# Patient Record
Sex: FEMALE | Race: BLACK OR AFRICAN AMERICAN | Employment: UNEMPLOYED | URBAN - METROPOLITAN AREA
[De-identification: names, ages, dates, MRNs, and addresses within clinical notes are randomized per-mention and may not be internally consistent; named-entity substitution may affect disease eponyms.]

---

## 2022-04-08 ENCOUNTER — HOSPITAL ENCOUNTER (EMERGENCY)
Age: 52
Discharge: HOME | End: 2022-04-08
Attending: EMERGENCY MEDICINE
Payer: COMMERCIAL

## 2022-04-08 VITALS
BODY MASS INDEX: 51.89 KG/M2 | SYSTOLIC BLOOD PRESSURE: 154 MMHG | HEIGHT: 62 IN | DIASTOLIC BLOOD PRESSURE: 78 MMHG | TEMPERATURE: 98.2 F | WEIGHT: 282 LBS | OXYGEN SATURATION: 97 % | RESPIRATION RATE: 16 BRPM | HEART RATE: 104 BPM

## 2022-04-08 DIAGNOSIS — R73.9 BLOOD GLUCOSE ELEVATED: Primary | ICD-10-CM

## 2022-04-08 LAB
ALBUMIN SERPL-MCNC: 2.9 G/DL (ref 3.4–5)
ALBUMIN/GLOB SERPL: 0.6 {RATIO} (ref 0.8–1.7)
ALP SERPL-CCNC: 132 U/L (ref 45–117)
ALT SERPL-CCNC: 16 U/L (ref 13–56)
ANION GAP SERPL CALC-SCNC: 7 MMOL/L (ref 3–18)
APPEARANCE UR: CLEAR
AST SERPL-CCNC: 12 U/L (ref 10–38)
ATRIAL RATE: 101 BPM
BASOPHILS # BLD: 0 K/UL (ref 0–0.1)
BASOPHILS NFR BLD: 1 % (ref 0–2)
BILIRUB SERPL-MCNC: 0.3 MG/DL (ref 0.2–1)
BILIRUB UR QL: NEGATIVE
BUN SERPL-MCNC: 10 MG/DL (ref 7–18)
BUN/CREAT SERPL: 16 (ref 12–20)
CALCIUM SERPL-MCNC: 9 MG/DL (ref 8.5–10.1)
CALCULATED P AXIS, ECG09: 9 DEGREES
CALCULATED R AXIS, ECG10: -9 DEGREES
CALCULATED T AXIS, ECG11: 48 DEGREES
CHLORIDE SERPL-SCNC: 102 MMOL/L (ref 100–111)
CO2 SERPL-SCNC: 26 MMOL/L (ref 21–32)
COLOR UR: YELLOW
CREAT SERPL-MCNC: 0.63 MG/DL (ref 0.6–1.3)
DIAGNOSIS, 93000: NORMAL
DIFFERENTIAL METHOD BLD: NORMAL
EOSINOPHIL # BLD: 0.1 K/UL (ref 0–0.4)
EOSINOPHIL NFR BLD: 1 % (ref 0–5)
ERYTHROCYTE [DISTWIDTH] IN BLOOD BY AUTOMATED COUNT: 12.4 % (ref 11.6–14.5)
GLOBULIN SER CALC-MCNC: 4.6 G/DL (ref 2–4)
GLUCOSE BLD STRIP.AUTO-MCNC: 264 MG/DL (ref 70–110)
GLUCOSE BLD STRIP.AUTO-MCNC: 336 MG/DL (ref 70–110)
GLUCOSE SERPL-MCNC: 389 MG/DL (ref 74–99)
GLUCOSE UR STRIP.AUTO-MCNC: >1000 MG/DL
HCT VFR BLD AUTO: 38.5 % (ref 35–45)
HGB BLD-MCNC: 12.9 G/DL (ref 12–16)
HGB UR QL STRIP: NEGATIVE
IMM GRANULOCYTES # BLD AUTO: 0 K/UL (ref 0–0.04)
IMM GRANULOCYTES NFR BLD AUTO: 0 % (ref 0–0.5)
KETONES UR QL STRIP.AUTO: 80 MG/DL
LEUKOCYTE ESTERASE UR QL STRIP.AUTO: NEGATIVE
LYMPHOCYTES # BLD: 2.8 K/UL (ref 0.9–3.6)
LYMPHOCYTES NFR BLD: 42 % (ref 21–52)
MCH RBC QN AUTO: 28 PG (ref 24–34)
MCHC RBC AUTO-ENTMCNC: 33.5 G/DL (ref 31–37)
MCV RBC AUTO: 83.7 FL (ref 78–100)
MONOCYTES # BLD: 0.5 K/UL (ref 0.05–1.2)
MONOCYTES NFR BLD: 7 % (ref 3–10)
NEUTS SEG # BLD: 3.3 K/UL (ref 1.8–8)
NEUTS SEG NFR BLD: 49 % (ref 40–73)
NITRITE UR QL STRIP.AUTO: NEGATIVE
NRBC # BLD: 0 K/UL (ref 0–0.01)
NRBC BLD-RTO: 0 PER 100 WBC
P-R INTERVAL, ECG05: 148 MS
PH UR STRIP: 6 [PH] (ref 5–8)
PLATELET # BLD AUTO: 357 K/UL (ref 135–420)
PMV BLD AUTO: 10.5 FL (ref 9.2–11.8)
POTASSIUM SERPL-SCNC: 4.2 MMOL/L (ref 3.5–5.5)
PROT SERPL-MCNC: 7.5 G/DL (ref 6.4–8.2)
PROT UR STRIP-MCNC: NEGATIVE MG/DL
Q-T INTERVAL, ECG07: 352 MS
QRS DURATION, ECG06: 94 MS
QTC CALCULATION (BEZET), ECG08: 456 MS
RBC # BLD AUTO: 4.6 M/UL (ref 4.2–5.3)
SODIUM SERPL-SCNC: 135 MMOL/L (ref 136–145)
SP GR UR REFRACTOMETRY: >1.03 (ref 1–1.03)
UROBILINOGEN UR QL STRIP.AUTO: 1 EU/DL (ref 0.2–1)
VENTRICULAR RATE, ECG03: 101 BPM
WBC # BLD AUTO: 6.7 K/UL (ref 4.6–13.2)

## 2022-04-08 PROCEDURE — 93005 ELECTROCARDIOGRAM TRACING: CPT

## 2022-04-08 PROCEDURE — 81003 URINALYSIS AUTO W/O SCOPE: CPT

## 2022-04-08 PROCEDURE — 96361 HYDRATE IV INFUSION ADD-ON: CPT

## 2022-04-08 PROCEDURE — 99284 EMERGENCY DEPT VISIT MOD MDM: CPT

## 2022-04-08 PROCEDURE — 82962 GLUCOSE BLOOD TEST: CPT

## 2022-04-08 PROCEDURE — 74011250636 HC RX REV CODE- 250/636: Performed by: EMERGENCY MEDICINE

## 2022-04-08 PROCEDURE — 74011636637 HC RX REV CODE- 636/637: Performed by: EMERGENCY MEDICINE

## 2022-04-08 PROCEDURE — 96360 HYDRATION IV INFUSION INIT: CPT

## 2022-04-08 PROCEDURE — 85025 COMPLETE CBC W/AUTO DIFF WBC: CPT

## 2022-04-08 PROCEDURE — 80053 COMPREHEN METABOLIC PANEL: CPT

## 2022-04-08 RX ORDER — ATORVASTATIN CALCIUM 20 MG/1
20 TABLET, FILM COATED ORAL DAILY
Qty: 30 TABLET | Refills: 0 | Status: SHIPPED | OUTPATIENT
Start: 2022-04-08 | End: 2022-05-03 | Stop reason: SDUPTHER

## 2022-04-08 RX ORDER — INSULIN GLARGINE 100 [IU]/ML
14 INJECTION, SOLUTION SUBCUTANEOUS
Qty: 4.2 ML | Refills: 0 | Status: SHIPPED | OUTPATIENT
Start: 2022-04-08 | End: 2022-05-03

## 2022-04-08 RX ORDER — METFORMIN HYDROCHLORIDE 500 MG/1
1000 TABLET ORAL 2 TIMES DAILY WITH MEALS
Qty: 30 TABLET | Refills: 0 | Status: SHIPPED | OUTPATIENT
Start: 2022-04-08 | End: 2022-05-03

## 2022-04-08 RX ORDER — ATENOLOL 50 MG/1
50 TABLET ORAL DAILY
Qty: 30 TABLET | Refills: 0 | Status: SHIPPED | OUTPATIENT
Start: 2022-04-08 | End: 2022-05-03 | Stop reason: SDUPTHER

## 2022-04-08 RX ADMIN — SODIUM CHLORIDE 1000 ML: 9 INJECTION, SOLUTION INTRAVENOUS at 08:49

## 2022-04-08 RX ADMIN — Medication 10 UNITS: at 10:16

## 2022-04-08 RX ADMIN — SODIUM CHLORIDE 1000 ML: 900 INJECTION, SOLUTION INTRAVENOUS at 10:16

## 2022-04-08 NOTE — ED NOTES
Labs drawn off IV site to right [facility] #20g labeled at bedside and walked to lab. Pt tolerated well.

## 2022-04-08 NOTE — ED TRIAGE NOTES
Pt arrived to ED c/o elevated BS. [facility] and told her [facility] was 400. Pt with hx DM and has been out of insulin for two months. Pt c/o bilat leg numbness and dry mouth. Pt denies CP, SOB, urinary symptoms. Pt is alwrt and awake. Pt amb to room with steady gait.

## 2022-04-08 NOTE — ED PROVIDER NOTES
EMERGENCY DEPARTMENT HISTORY AND PHYSICAL EXAM    8:15 AM  Date: (Not on file)  Patient Name: Richard Kingston    History of Presenting Illness       History Provided By:     HPI: Richard Kingston is a 46 y.o. female with past medical history of diabetes presents with elevated blood glucose. Patient describes having dry mouth and tingling of her bilateral extremities. As per patient she moved here from [place] recently and she ran out of her medicine 2 months ago. Patient was on atenolol, atorvastatin, glargine Metformin. Patient was seen yesterday in urgent care and her glucose was 410. Patient does not have a PMD yet. No nausea, vomiting or abdominal pain. PCP: No primary care provider on file. Past History     Past Medical History:  No past medical history on file. Past Surgical History:  No past surgical history on file. Family History:  No family history on file. Social History:  Social History     Tobacco Use    Smoking status: Not on file    Smokeless tobacco: Not on file   Substance Use Topics    Alcohol use: Not on file    Drug use: Not on file       Allergies:  Not on File    Review of Systems   Review of Systems   Constitutional: Negative for activity change, appetite change and chills. HENT: Negative for congestion, ear discharge, ear pain and sore throat. Eyes: Negative for photophobia and pain. Respiratory: Negative for cough and choking. Cardiovascular: Negative for palpitations and leg swelling. Gastrointestinal: Negative for anal bleeding and rectal pain. Endocrine: Negative for polydipsia and polyuria. Genitourinary: Negative for genital sores and urgency. Musculoskeletal: Negative for arthralgias and myalgias. Neurological: Negative for dizziness, seizures and speech difficulty. Psychiatric/Behavioral: Negative for hallucinations, self-injury and suicidal ideas. Physical Exam     No data found.     Physical Exam  Vitals and nursing note reviewed. Constitutional:       Appearance: She is well-developed. HENT:      Head: Normocephalic and atraumatic. Eyes:      General:         Right eye: No discharge. Left eye: No discharge. Cardiovascular:      Rate and Rhythm: Normal rate and regular rhythm. Heart sounds: Normal heart sounds. No murmur heard. Pulmonary:      Effort: Pulmonary effort is normal. No respiratory distress. Breath sounds: Normal breath sounds. No stridor. No wheezing or rales. Chest:      Chest wall: No tenderness. Abdominal:      General: Bowel sounds are normal. There is no distension. Palpations: Abdomen is soft. Tenderness: There is no abdominal tenderness. There is no guarding or rebound. Musculoskeletal:         General: Normal range of motion. Cervical back: Normal range of motion and neck supple. Skin:     General: Skin is warm and dry. Neurological:      Mental Status: She is alert and oriented to person, place, and time. Diagnostic Study Results     Labs -  No results found for this or any previous visit (from the past 12 hour(s)). Radiologic Studies -   No results found. Medical Decision Making     ED Course: Progress Notes, Reevaluation, and Consults:    8:15 AM Initial assessment performed. The patients presenting problems have been discussed, and they/their family are in agreement with the care plan formulated and outlined with them. I have encouraged them to ask questions as they arise throughout their visit. Provider Notes (Medical Decision Making):   Patient with past medical history of diabetes noncompliant with medication presents with elevated blood glucose   vitals within normal limits apart from hypertension.   Plan to obtain labs, imaging  Old medical records reviewed:  EKG as interpreted by me: 101, sinus tachycardia, no ST changes  Labs as interpreted by me:  Glucose 389  No leukocytosis, no electrolyte abnormality  No elevated anion gap  Repeat glucose 264  Patient given IV fluids and insulin  Repeat glucose 264  Patient currently asymptomatic  Patient will be given prescription of her Medicare medications and advised follow-up with PMD for blood pressure and diabetes control  Strict return precautions given      Vital Signs-Reviewed the patient's vital signs. Reviewed pt's pulse ox reading. Records Reviewed: old medical records  -I am the first provider for this patient.  -I reviewed the vital signs, available nursing notes, past medical history, past surgical history, family history and social history. Clinical Impression     Clinical Impression: No diagnosis found. Disposition:    Pt has been reexamined. Patient has no new complaints, changes, or physical findings. Care plan outlined and precautions discussed. Results were reviewed with the patient. All medications were reviewed with the patient; will d/c home with PMD f/u. All of pt's questions and concerns were addressed. Patient was instructed and agrees to follow up with PMD, as well as to return to the ED upon further deterioration. Patient is ready to go home. This note was dictated utilizing voice recognition software which may lead to typographical errors. I apologize in advance if the situation occurs. If questions arise please do not hesitate to contact me or call our department. This note was dictated utilizing voice recognition software which may lead to typographical errors. I apologize in advance if the situation occurs. If questions arise please do not hesitate to contact me or call our department.     Ayesha France MD  8:15 AM

## 2022-05-03 ENCOUNTER — OFFICE VISIT (OUTPATIENT)
Dept: FAMILY MEDICINE CLINIC | Age: 52
End: 2022-05-03
Payer: COMMERCIAL

## 2022-05-03 VITALS
SYSTOLIC BLOOD PRESSURE: 130 MMHG | HEART RATE: 84 BPM | OXYGEN SATURATION: 99 % | WEIGHT: 282 LBS | TEMPERATURE: 98 F | HEIGHT: 66 IN | BODY MASS INDEX: 45.32 KG/M2 | DIASTOLIC BLOOD PRESSURE: 80 MMHG | RESPIRATION RATE: 16 BRPM

## 2022-05-03 DIAGNOSIS — E11.65 TYPE 2 DIABETES MELLITUS WITH HYPERGLYCEMIA, UNSPECIFIED WHETHER LONG TERM INSULIN USE (HCC): Primary | ICD-10-CM

## 2022-05-03 DIAGNOSIS — I10 ESSENTIAL HYPERTENSION: ICD-10-CM

## 2022-05-03 DIAGNOSIS — Z11.59 ENCOUNTER FOR HEPATITIS C SCREENING TEST FOR LOW RISK PATIENT: ICD-10-CM

## 2022-05-03 DIAGNOSIS — Z00.00 ENCOUNTER FOR MEDICAL EXAMINATION TO ESTABLISH CARE: ICD-10-CM

## 2022-05-03 DIAGNOSIS — E78.5 HYPERLIPIDEMIA, UNSPECIFIED HYPERLIPIDEMIA TYPE: ICD-10-CM

## 2022-05-03 DIAGNOSIS — Z12.31 BREAST CANCER SCREENING BY MAMMOGRAM: ICD-10-CM

## 2022-05-03 LAB — HBA1C MFR BLD HPLC: 13.1 %

## 2022-05-03 PROCEDURE — 83036 HEMOGLOBIN GLYCOSYLATED A1C: CPT | Performed by: STUDENT IN AN ORGANIZED HEALTH CARE EDUCATION/TRAINING PROGRAM

## 2022-05-03 PROCEDURE — 3046F HEMOGLOBIN A1C LEVEL >9.0%: CPT | Performed by: STUDENT IN AN ORGANIZED HEALTH CARE EDUCATION/TRAINING PROGRAM

## 2022-05-03 PROCEDURE — 99204 OFFICE O/P NEW MOD 45 MIN: CPT | Performed by: STUDENT IN AN ORGANIZED HEALTH CARE EDUCATION/TRAINING PROGRAM

## 2022-05-03 RX ORDER — METFORMIN HYDROCHLORIDE 500 MG/1
1000 TABLET, EXTENDED RELEASE ORAL 2 TIMES DAILY
COMMUNITY
Start: 2022-04-20

## 2022-05-03 RX ORDER — ATENOLOL 50 MG/1
50 TABLET ORAL DAILY
Qty: 30 TABLET | Refills: 0 | Status: SHIPPED | OUTPATIENT
Start: 2022-05-03 | End: 2022-05-27

## 2022-05-03 RX ORDER — DULAGLUTIDE 0.75 MG/.5ML
0.75 INJECTION, SOLUTION SUBCUTANEOUS
Qty: 2 ML | Refills: 0 | Status: SHIPPED | OUTPATIENT
Start: 2022-05-03 | End: 2022-05-31

## 2022-05-03 RX ORDER — ATORVASTATIN CALCIUM 20 MG/1
20 TABLET, FILM COATED ORAL DAILY
Qty: 30 TABLET | Refills: 0 | Status: SHIPPED | OUTPATIENT
Start: 2022-05-03 | End: 2022-05-27

## 2022-05-03 RX ORDER — INSULIN GLARGINE 100 [IU]/ML
10 INJECTION, SOLUTION SUBCUTANEOUS
Qty: 3 ML | Refills: 0
Start: 2022-05-03 | End: 2022-06-02

## 2022-05-03 NOTE — PROGRESS NOTES
Solitario Nelson is a 46 y.o. female presenting today for Establish Care  . Chief Complaint   Patient presents with    Establish Care       HPI:  Solitario Nelson presents to the office today to establish care. Patient has a PMHx of T2DM, HTN, HLD. Patient presented to ED on 4/8/22 for medication refills and hyperglycemia. She had recently visited urgent care due to the same. She was noted to have elevated glucose: 389 with glucosuria. Patient did not have insurance so was unable to follow with a PCP. She was unable to afford medications since November 2021. Patient now has insurance and desires to get back on track. T2DM: She was diagnosed 10 years ago. Patient was started on Lantus 10 units nightly along with metformin 500 mg twice daily. She has been on glipizide and glimepiride in the past. Patient reports polydipsia. Polyuria has improved. She reported GI symptoms - diarrhea with metformin that improved once she switched to the ER formulation. Reports numbness/tingling in her bilateral feet. Last [facility]1c was 15% at [facility]. Today it is 13.1%  Patient has a glucometer at home but has not been checking her sugars as she states it makes her feel depressed looking at the high numbers. HTN: Patient is on atenolol with improved BP as compared to ED visit    HLD: She was started back on atorvastatin. Review of Systems   Constitutional: Negative for chills, diaphoresis, fever, malaise/fatigue and weight loss. HENT: Negative for congestion, ear discharge, ear pain, hearing loss, nosebleeds, sinus pain, sore throat and tinnitus. Eyes: Negative for blurred vision, double vision and photophobia. Respiratory: Negative for cough, sputum production, shortness of breath, wheezing and stridor. Cardiovascular: Positive for palpitations. Negative for chest pain, orthopnea, claudication and leg swelling. Gastrointestinal: Positive for heartburn.  Negative for abdominal pain, blood in stool, constipation, diarrhea, melena, nausea and vomiting. Genitourinary: Positive for frequency. Negative for dysuria, flank pain, hematuria and urgency. Musculoskeletal: Positive for back pain and joint pain. Negative for myalgias and neck pain. Skin: Negative for rash. Neurological: Positive for tingling and sensory change. Negative for tremors, speech change, focal weakness, seizures, weakness and headaches. Psychiatric/Behavioral: Negative for depression. The patient is not nervous/anxious. All other systems reviewed and are negative. Allergies   Allergen Reactions    Codeine Itching       PHQ Screening   3 most recent PHQ Screens 5/3/2022   Little interest or pleasure in doing things Not at all   Feeling down, depressed, irritable, or hopeless Nearly every day   Total Score PHQ 2 3   Trouble falling or staying asleep, or sleeping too much Nearly every day   Feeling tired or having little energy Not at all   Poor appetite, weight loss, or overeating Not at all   Feeling bad about yourself - or that you are a failure or have let yourself or your family down Not at all   Trouble concentrating on things such as school, work, reading, or watching TV Not at all   Moving or speaking so slowly that other people could have noticed; or the opposite being so fidgety that others notice Nearly every day   Thoughts of being better off dead, or hurting yourself in some way Not at all   PHQ 9 Score 9   How difficult have these problems made it for you to do your work, take care of your home and get along with others Somewhat difficult       History  Past Medical History:   Diagnosis Date    Arthritis     Diabetes ([facility] [address]        History reviewed. No pertinent surgical history.     Social History     Socioeconomic History    Marital status: SINGLE     Spouse name: Not on file    Number of children: Not on file    Years of education: Not on file    Highest education level: Not on file   Occupational History    Not on file   Tobacco Use    Smoking status: Never Smoker    Smokeless tobacco: Never Used   Vaping Use    Vaping Use: Never used   Substance and Sexual Activity    Alcohol use: Yes    Drug use: Never    Sexual activity: Never   Other Topics Concern    Not on file   Social History Narrative    Not on file     Social Determinants of Health     Financial Resource Strain:     Difficulty of Paying Living Expenses: Not on file   Food Insecurity:     Worried About Running Out of Food in the Last Year: Not on file    Koby of Food in the Last Year: Not on file   Transportation Needs:     Lack of Transportation (Medical): Not on file    Lack of Transportation (Non-Medical): Not on file   Physical Activity:     Days of Exercise per Week: Not on file    Minutes of Exercise per Session: Not on file   Stress:     Feeling of Stress : Not on file   Social Connections:     Frequency of Communication with Friends and Family: Not on file    Frequency of Social Gatherings with Friends and Family: Not on file    Attends Mu-ism Services: Not on file    Active Member of [address] [place] [address] or Organizations: Not on file    Attends Club or Organization Meetings: Not on file    Marital Status: Not on file   Intimate Partner Violence:     Fear of Current or Ex-Partner: Not on file    Emotionally Abused: Not on file    Physically Abused: Not on file    Sexually Abused: Not on file   Housing Stability:     Unable to Pay for Housing in the Last Year: Not on file    Number of [place] in the Last Year: Not on file    Unstable Housing in the Last Year: Not on file       Current Outpatient Medications   Medication Sig Dispense Refill    atenoloL (TENORMIN) 50 mg tablet Take 1 Tablet by mouth daily for 30 days. 30 Tablet 0    atorvastatin (LIPITOR) 20 mg tablet Take 1 Tablet by mouth daily.  30 Tablet 0    dulaglutide (Trulicity) 2.34 JG/1.4 mL sub-q pen 0.5 mL by SubCUTAneous route every seven (7) days for 30 days. Indications: type 2 diabetes mellitus 2 mL 0    insulin glargine (Basaglar KwikPen U-100 Insulin) 100 unit/mL (3 mL) inpn 10 Units by SubCUTAneous route nightly for 30 days. 3 mL 0    metFORMIN ER (GLUCOPHAGE XR) 500 mg tablet Take 1,000 mg by mouth two (2) times a day. Vitals:    05/03/22 0934   BP: 130/80   Pulse: 84   Resp: 16   Temp: 98 °F (36.7 °C)   TempSrc: Oral   SpO2: 99%   Weight: 282 lb (127.9 kg)   Height: 5' 6\" (1.676 m)   PainSc:   4       Physical Exam  Vitals and nursing note reviewed. Constitutional:       General: She is not in acute distress. Appearance: Normal appearance. She is obese. She is not ill-appearing, toxic-appearing or diaphoretic. HENT:      Head: Normocephalic and atraumatic. Eyes:      General: No scleral icterus. Extraocular Movements: Extraocular movements intact. Conjunctiva/sclera: Conjunctivae normal.      Pupils: Pupils are equal, round, and reactive to light. Cardiovascular:      Rate and Rhythm: Normal rate and regular rhythm. Pulses: Normal pulses. Heart sounds: No murmur heard. Pulmonary:      Effort: Pulmonary effort is normal. No respiratory distress. Breath sounds: Normal breath sounds. No wheezing or rales. Abdominal:      General: Bowel sounds are normal. There is no distension. Palpations: Abdomen is soft. Tenderness: There is no abdominal tenderness. There is no guarding. Musculoskeletal:      Cervical back: Normal range of motion. Right lower leg: Edema present. Left lower leg: Edema present. Comments: Limited ROM due to back and knee pain  Trace LE edema   Skin:     General: Skin is warm and dry. Coloration: Skin is not jaundiced or pale. Neurological:      General: No focal deficit present. Mental Status: She is alert and oriented to person, place, and time. Mental status is at baseline. Cranial Nerves: No cranial nerve deficit. Motor: No weakness. Gait: Gait normal.   Psychiatric:         Mood and Affect: Mood normal.         Behavior: Behavior normal.         Thought Content: Thought content normal.         Judgment: Judgment normal.       Diabetic foot exam: (5/3/22)    Left Foot:   Visual Exam: normal    Pulse DP: 2+ (normal)   Filament test: reduced sensation          Right Foot:   Visual Exam: normal    Pulse DP: 2+ (normal)   Filament test: reduced sensation            Office Visit on 05/03/2022   Component Date Value Ref Range Status    Hemoglobin A1c (POC) 05/03/2022 13.1  % Final   Admission on 04/08/2022, Discharged on 04/08/2022   Component Date Value Ref Range Status    WBC 04/08/2022 6.7  4.6 - 13.2 K/uL Final    RBC 04/08/2022 4.60  4.20 - 5.30 M/uL Final    HGB 04/08/2022 12.9  12.0 - 16.0 g/dL Final    HCT 04/08/2022 38.5  35.0 - 45.0 % Final    MCV 04/08/2022 83.7  78.0 - 100.0 FL Final    MCH 04/08/2022 28.0  24.0 - 34.0 PG Final    MCHC 04/08/2022 33.5  31.0 - 37.0 g/dL Final    RDW 04/08/2022 12.4  11.6 - 14.5 % Final    PLATELET 70/83/5348 352  135 - 420 K/uL Final    MPV 04/08/2022 10.5  9.2 - 11.8 FL Final    NRBC 04/08/2022 0.0  0  WBC Final    ABSOLUTE NRBC 04/08/2022 0.00  0.00 - 0.01 K/uL Final    NEUTROPHILS 04/08/2022 49  40 - 73 % Final    LYMPHOCYTES 04/08/2022 42  21 - 52 % Final    MONOCYTES 04/08/2022 7  3 - 10 % Final    EOSINOPHILS 04/08/2022 1  0 - 5 % Final    BASOPHILS 04/08/2022 1  0 - 2 % Final    IMMATURE GRANULOCYTES 04/08/2022 0  0.0 - 0.5 % Final    ABS. NEUTROPHILS 04/08/2022 3.3  1.8 - 8.0 K/UL Final    ABS. LYMPHOCYTES 04/08/2022 2.8  0.9 - 3.6 K/UL Final    ABS. MONOCYTES 04/08/2022 0.5  0.05 - 1.2 K/UL Final    ABS. EOSINOPHILS 04/08/2022 0.1  0.0 - 0.4 K/UL Final    ABS. BASOPHILS 04/08/2022 0.0  0.0 - 0.1 K/UL Final    ABS. IMM.  GRANS. 04/08/2022 0.0  0.00 - 0.04 K/UL Final    DF 04/08/2022 AUTOMATED    Final    Sodium 04/08/2022 135* 136 - 145 mmol/L Final    Potassium 04/08/2022 4.2  3.5 - 5.5 mmol/L Final    Chloride 04/08/2022 102  100 - 111 mmol/L Final    CO2 04/08/2022 26  21 - 32 mmol/L Final    Anion gap 04/08/2022 7  3.0 - 18 mmol/L Final    Glucose 04/08/2022 389* 74 - 99 mg/dL Final    BUN 04/08/2022 10  7.0 - 18 MG/DL Final    Creatinine 04/08/2022 0.63  0.6 - 1.3 MG/DL Final    BUN/Creatinine ratio 04/08/2022 16  12 - 20   Final    GFR est AA 04/08/2022 >60  >60 ml/min/1.73m2 Final    GFR est non-AA 04/08/2022 >60  >60 ml/min/1.73m2 Final    Comment: (NOTE)  Estimated GFR is calculated using the Modification of Diet in Renal   Disease (MDRD) Study equation, reported for both  Americans   (GFRAA) and non- Americans (GFRNA), and normalized to 1.73m2   body surface area. The physician must decide which value applies to   the patient. The MDRD study equation should only be used in   individuals age 25 or older. It has not been validated for the   following: pregnant women, patients with serious comorbid conditions,   or on certain medications, or persons with extremes of body size,   muscle mass, or nutritional status.  Calcium 04/08/2022 9.0  8.5 - 10.1 MG/DL Final    Bilirubin, total 04/08/2022 0.3  0.2 - 1.0 MG/DL Final    ALT (SGPT) 04/08/2022 16  13 - 56 U/L Final    AST (SGOT) 04/08/2022 12  10 - 38 U/L Final    Alk.  phosphatase 04/08/2022 132* 45 - 117 U/L Final    Protein, total 04/08/2022 7.5  6.4 - 8.2 g/dL Final    Albumin 04/08/2022 2.9* 3.4 - 5.0 g/dL Final    Globulin 04/08/2022 4.6* 2.0 - 4.0 g/dL Final    A-G Ratio 04/08/2022 0.6* 0.8 - 1.7   Final    Ventricular Rate 04/08/2022 101  BPM Final    Atrial Rate 04/08/2022 101  BPM Final    P-R Interval 04/08/2022 148  ms Final    QRS Duration 04/08/2022 94  ms Final    Q-T Interval 04/08/2022 352  ms Final    QTC Calculation (Bezet) 04/08/2022 456  ms Final    Calculated P Axis 04/08/2022 9  degrees Final    Calculated R Axis 04/08/2022 -9  degrees Final    Calculated T Axis 04/08/2022 48  degrees Final    Diagnosis 04/08/2022    Final                    Value:Sinus tachycardia  Possible Left atrial enlargement  Septal infarct , age undetermined  Abnormal ECG  No previous ECGs available  Confirmed by Silvana Payan MD, Mary Torres ([id number]) on 4/8/2022 4:55:37 PM      Glucose (POC) 04/08/2022 336* 70 - 110 mg/dL Final    Comment: Notified RN or MD immediately by   (NOTE)  The FDA has indicated that no capillary point of care blood glucose   monitoring systems are approved for use in \"critically ill\" patients,   however they have not defined this population. The College of   American Pathologists has recommended that these devices should not   be used in cases such as severe hypotension, dehydration, shock, and   hyperglycemic-hyperosmolar state, amongst others. Venous or arterial   collection is the recommended specimen for testing these patients.  Color 04/08/2022 YELLOW    Final    Appearance 04/08/2022 CLEAR    Final    Specific gravity 04/08/2022 >1.030* 1.005 - 1.030 Final    pH (UA) 04/08/2022 6.0  5.0 - 8.0   Final    Protein 04/08/2022 Negative  NEG mg/dL Final    Glucose 04/08/2022 >1,000* NEG mg/dL Final    Ketone 04/08/2022 80* NEG mg/dL Final    Bilirubin 04/08/2022 Negative  NEG   Final    Blood 04/08/2022 Negative  NEG   Final    Urobilinogen 04/08/2022 1.0  0.2 - 1.0 EU/dL Final    Nitrites 04/08/2022 Negative  NEG   Final    Leukocyte Esterase 04/08/2022 Negative  NEG   Final    Glucose (POC) 04/08/2022 264* 70 - 110 mg/dL Final    Comment: (NOTE)  The FDA has indicated that no capillary point of care blood glucose   monitoring systems are approved for use in \"critically ill\" patients,   however they have not defined this population. The College of   American Pathologists has recommended that these devices should not   be used in cases such as severe hypotension, dehydration, shock, and   hyperglycemic-hyperosmolar state, amongst others. Venous or arterial   collection is the recommended specimen for testing these patients. Results for orders placed or performed in visit on 05/03/22   AMB POC HEMOGLOBIN A1C   Result Value Ref Range    Hemoglobin A1c (POC) 13.1 %       Patient Care Team:  Patient Care Team:  None as PCP - General      Assessment / Plan:      ICD-10-CM ICD-9-CM    1. Type 2 diabetes mellitus with hyperglycemia, unspecified whether long term insulin use (HCC)  E11.65 250.00 AMB POC HEMOGLOBIN A1C      MICROALBUMIN, UR, RAND W/ MICROALB/CREAT RATIO      dulaglutide (Trulicity) 5.41 UP/6.5 mL sub-q pen       DIABETES FOOT EXAM      insulin glargine (Basaglar KwikPen U-100 Insulin) 100 unit/mL (3 mL) inpn   2. Breast cancer screening by mammogram  Z12.31 V76.12 [facility] MAMMO BI SCREENING INCL CAD   3. Essential hypertension  I10 401.9 atenoloL (TENORMIN) 50 mg tablet      TSH 3RD GENERATION   4. Hyperlipidemia, unspecified hyperlipidemia type  E78.5 272.4 atorvastatin (LIPITOR) 20 mg tablet      LIPID PANEL   5. Encounter for hepatitis C screening test for low risk patient  Z11.59 V73.89 HEPATITIS C AB   6. Encounter for medical examination to establish care  Z00.00 V70.9      T2DM: Uncontrolled. Continue Metformin. Continue insulin lantus at 10 units nightly for now. Counseled on keeping a FBS log and bringing it with her next visit. Will start on Trulicity if covered by her insurance. Foot exam with reduced sensation bilaterally. Check urine microalbumin - may consider starting on low dose ARB next visit. Due for eye exam.     HLD: Continue statin: Check lipid panel. HTN: BP acceptable. Continue atenolol. Patient has multiple healthcare gaps. Mammogram ordered. Discuss Colon Ca screening at next visit  Due for pap smear. Follow-up and Dispositions    · Return in about 4 weeks (around 5/31/2022) for Diabetes F/U, 15 mins, Labs Review.          I asked the patient if she  had any questions and answered her questions. The patient stated that she understands the treatment plan and agrees with the treatment plan    This document was created with a voice activated dictation system and may contain transcription errors.

## 2022-05-03 NOTE — PROGRESS NOTES
Chief Complaint   Patient presents with   [facility]     1. \"Have you been to the ER, urgent care clinic since your last visit? Hospitalized since your last visit? \" Yes    2. \"Have you seen or consulted any other health care providers outside of the [address] [place] [address] since your last visit? \" Yes      3. For patients aged 39-70: Has the patient had a colonoscopy / FIT/ Cologuard? No      If the patient is female:    4. For patients aged 41-77: Has the patient had a mammogram within the past 2 years? No      5. For patients aged 21-65: Has the patient had a pap smear?  No

## 2022-05-09 ENCOUNTER — TRANSCRIBE ORDER (OUTPATIENT)
Dept: SCHEDULING | Age: 52
End: 2022-05-09

## 2022-05-09 DIAGNOSIS — Z12.31 VISIT FOR SCREENING MAMMOGRAM: Primary | ICD-10-CM

## 2022-05-17 ENCOUNTER — HOSPITAL ENCOUNTER (OUTPATIENT)
Dept: MAMMOGRAPHY | Age: 52
Discharge: HOME | End: 2022-05-17
Attending: STUDENT IN AN ORGANIZED HEALTH CARE EDUCATION/TRAINING PROGRAM
Payer: COMMERCIAL

## 2022-05-17 DIAGNOSIS — Z12.31 VISIT FOR SCREENING MAMMOGRAM: ICD-10-CM

## 2022-05-17 PROCEDURE — 77063 BREAST TOMOSYNTHESIS BI: CPT

## 2022-05-18 ENCOUNTER — TELEPHONE (OUTPATIENT)
Dept: FAMILY MEDICINE CLINIC | Age: 52
End: 2022-05-18

## 2022-05-18 DIAGNOSIS — R92.8 ABNORMAL MAMMOGRAM OF LEFT BREAST: Primary | ICD-10-CM

## 2022-05-18 NOTE — TELEPHONE ENCOUNTER
Mammogram results discussed with patient over the phone. Screening mammogram showed left breast nodule - will order a diagnostic mammogram and ultrasound.

## 2022-05-27 DIAGNOSIS — E78.5 HYPERLIPIDEMIA, UNSPECIFIED HYPERLIPIDEMIA TYPE: ICD-10-CM

## 2022-05-27 DIAGNOSIS — I10 ESSENTIAL HYPERTENSION: ICD-10-CM

## 2022-05-27 RX ORDER — ATORVASTATIN CALCIUM 20 MG/1
TABLET, FILM COATED ORAL
Qty: 30 TABLET | Refills: 0 | Status: SHIPPED | OUTPATIENT
Start: 2022-05-27 | End: 2022-06-21

## 2022-05-27 RX ORDER — ATENOLOL 50 MG/1
TABLET ORAL
Qty: 30 TABLET | Refills: 0 | Status: SHIPPED | OUTPATIENT
Start: 2022-05-27 | End: 2022-06-21

## 2022-05-31 ENCOUNTER — OFFICE VISIT (OUTPATIENT)
Dept: FAMILY MEDICINE CLINIC | Age: 52
End: 2022-05-31
Payer: COMMERCIAL

## 2022-05-31 ENCOUNTER — HOSPITAL ENCOUNTER (OUTPATIENT)
Dept: LAB | Age: 52
Discharge: HOME | End: 2022-05-31
Payer: COMMERCIAL

## 2022-05-31 VITALS
TEMPERATURE: 97.6 F | BODY MASS INDEX: 45.64 KG/M2 | HEIGHT: 66 IN | DIASTOLIC BLOOD PRESSURE: 70 MMHG | SYSTOLIC BLOOD PRESSURE: 125 MMHG | HEART RATE: 88 BPM | RESPIRATION RATE: 16 BRPM | WEIGHT: 284 LBS | OXYGEN SATURATION: 95 %

## 2022-05-31 DIAGNOSIS — E11.65 TYPE 2 DIABETES MELLITUS WITH HYPERGLYCEMIA, UNSPECIFIED WHETHER LONG TERM INSULIN USE (HCC): Primary | ICD-10-CM

## 2022-05-31 DIAGNOSIS — Z11.59 ENCOUNTER FOR HEPATITIS C SCREENING TEST FOR LOW RISK PATIENT: ICD-10-CM

## 2022-05-31 DIAGNOSIS — I10 ESSENTIAL HYPERTENSION: ICD-10-CM

## 2022-05-31 DIAGNOSIS — M25.561 CHRONIC PAIN OF BOTH KNEES: ICD-10-CM

## 2022-05-31 DIAGNOSIS — E78.5 HYPERLIPIDEMIA, UNSPECIFIED HYPERLIPIDEMIA TYPE: ICD-10-CM

## 2022-05-31 DIAGNOSIS — Z12.4 SCREENING FOR CERVICAL CANCER: ICD-10-CM

## 2022-05-31 DIAGNOSIS — E11.65 TYPE 2 DIABETES MELLITUS WITH HYPERGLYCEMIA, UNSPECIFIED WHETHER LONG TERM INSULIN USE (HCC): ICD-10-CM

## 2022-05-31 DIAGNOSIS — M25.562 CHRONIC PAIN OF BOTH KNEES: ICD-10-CM

## 2022-05-31 DIAGNOSIS — Z12.11 SCREENING FOR COLON CANCER: ICD-10-CM

## 2022-05-31 DIAGNOSIS — G89.29 CHRONIC PAIN OF BOTH KNEES: ICD-10-CM

## 2022-05-31 LAB
CHOLEST SERPL-MCNC: 175 MG/DL
CREAT UR-MCNC: 154 MG/DL (ref 30–125)
HBA1C MFR BLD HPLC: 11.6 %
HDLC SERPL-MCNC: 45 MG/DL (ref 40–60)
HDLC SERPL: 3.9 {RATIO} (ref 0–5)
LDLC SERPL CALC-MCNC: 110.8 MG/DL (ref 0–100)
LIPID PROFILE,FLP: ABNORMAL
MICROALBUMIN UR-MCNC: 6.56 MG/DL (ref 0–3)
MICROALBUMIN/CREAT UR-RTO: 43 MG/G (ref 0–30)
TRIGL SERPL-MCNC: 96 MG/DL (ref ?–150)
TSH SERPL DL<=0.05 MIU/L-ACNC: 0.76 UIU/ML (ref 0.36–3.74)
VLDLC SERPL CALC-MCNC: 19.2 MG/DL

## 2022-05-31 PROCEDURE — 80061 LIPID PANEL: CPT

## 2022-05-31 PROCEDURE — 3046F HEMOGLOBIN A1C LEVEL >9.0%: CPT | Performed by: STUDENT IN AN ORGANIZED HEALTH CARE EDUCATION/TRAINING PROGRAM

## 2022-05-31 PROCEDURE — 99214 OFFICE O/P EST MOD 30 MIN: CPT | Performed by: STUDENT IN AN ORGANIZED HEALTH CARE EDUCATION/TRAINING PROGRAM

## 2022-05-31 PROCEDURE — 82043 UR ALBUMIN QUANTITATIVE: CPT

## 2022-05-31 PROCEDURE — 83036 HEMOGLOBIN GLYCOSYLATED A1C: CPT | Performed by: STUDENT IN AN ORGANIZED HEALTH CARE EDUCATION/TRAINING PROGRAM

## 2022-05-31 PROCEDURE — 36415 COLL VENOUS BLD VENIPUNCTURE: CPT

## 2022-05-31 PROCEDURE — 84443 ASSAY THYROID STIM HORMONE: CPT

## 2022-05-31 PROCEDURE — 86803 HEPATITIS C AB TEST: CPT

## 2022-05-31 RX ORDER — DULAGLUTIDE 0.75 MG/.5ML
0.75 INJECTION, SOLUTION SUBCUTANEOUS
Qty: 2 ML | Refills: 0 | Status: CANCELLED | OUTPATIENT
Start: 2022-05-31 | End: 2022-06-30

## 2022-05-31 RX ORDER — DULAGLUTIDE 1.5 MG/.5ML
1.5 INJECTION, SOLUTION SUBCUTANEOUS
Qty: 4 EACH | Refills: 1 | Status: SHIPPED | OUTPATIENT
Start: 2022-05-31 | End: 2022-06-28

## 2022-05-31 NOTE — PROGRESS NOTES
Lion Cisse is a 45 y.o. female presenting today for Follow-up and Knee Injury  . Chief Complaint   Patient presents with    Follow-up    Knee Injury       HPI:  Lion Cisse presents to the office today for follow up. Patient has a PMHx of T2DM, HTN, HLD.    T2DM: Patient is on Lantus 10 units nightly along with metformin 1000 mg twice daily. Previous visit she was also started on Trulicity. She reported GI symptoms - diarrhea with metformin that improved once she switched to the ER formulation. Reports numbness/tingling in her bilateral feet. Last HbA1c was 13.1%  Home FBS ranging 180-250s. She had an eye exam recently 3 months ago - she was informed she had cataracts. Knee Pain: Patient reporting bilateral knee pain. Denies any injury. Has been occurring since March 22. She has tried Tylenol arthritis with no relief. Motrin helps intermittently. Requesting to see orthopedics. HTN: Controlled on atenolol. HLD: She was started back on atorvastatin. Review of Systems   Constitutional: Negative for chills, diaphoresis, fever, malaise/fatigue and weight loss. HENT: Negative for congestion, ear discharge, ear pain, hearing loss, nosebleeds, sinus pain, sore throat and tinnitus. Eyes: Negative for blurred vision, double vision and photophobia. Respiratory: Negative for cough, sputum production, shortness of breath, wheezing and stridor. Cardiovascular: Positive for palpitations. Negative for chest pain, orthopnea, claudication and leg swelling. Gastrointestinal: Positive for heartburn. Negative for abdominal pain, blood in stool, constipation, diarrhea, melena, nausea and vomiting. Genitourinary: Positive for frequency. Negative for dysuria, flank pain, hematuria and urgency. Musculoskeletal: Positive for back pain and joint pain. Negative for myalgias and neck pain. Skin: Negative for rash. Neurological: Positive for tingling and sensory change.  Negative for tremors, speech change, focal weakness, seizures, weakness and headaches. Psychiatric/Behavioral: Negative for depression. The patient is not nervous/anxious. All other systems reviewed and are negative. Allergies   Allergen Reactions    Codeine Itching       PHQ Screening   3 most recent PHQ Screens 5/31/2022   Little interest or pleasure in doing things Not at all   Feeling down, depressed, irritable, or hopeless Not at all   Total Score PHQ 2 0   Trouble falling or staying asleep, or sleeping too much -   Feeling tired or having little energy -   Poor appetite, weight loss, or overeating -   Feeling bad about yourself - or that you are a failure or have let yourself or your family down -   Trouble concentrating on things such as school, work, reading, or watching TV -   Moving or speaking so slowly that other people could have noticed; or the opposite being so fidgety that others notice -   Thoughts of being better off dead, or hurting yourself in some way -   PHQ 9 Score -   How difficult have these problems made it for you to do your work, take care of your home and get along with others -       History  Past Medical History:   Diagnosis Date    Arthritis     Diabetes ([facility] [address]        No past surgical history on file. Social History     Socioeconomic History    Marital status:      Spouse name: Not on file    Number of children: Not on file    Years of education: Not on file    Highest education level: Not on file   Occupational History    Not on file   Tobacco Use    Smoking status: Never Smoker    Smokeless tobacco: Never Used   Vaping Use    Vaping Use: Never used   Substance and Sexual Activity    Alcohol use:  Yes    Drug use: Never    Sexual activity: Never   Other Topics Concern    Not on file   Social History Narrative    Not on file     Social Determinants of Health     Financial Resource Strain:     Difficulty of Paying Living Expenses: Not on file   Food Insecurity:  Worried About Running Out of Food in the Last Year: Not on file    Koby of Food in the Last Year: Not on file   Transportation Needs:     Lack of Transportation (Medical): Not on file    Lack of Transportation (Non-Medical): Not on file   Physical Activity:     Days of Exercise per Week: Not on file    Minutes of Exercise per Session: Not on file   Stress:     Feeling of Stress : Not on file   Social Connections:     Frequency of Communication with Friends and Family: Not on file    Frequency of Social Gatherings with Friends and Family: Not on file    Attends Baptism Services: Not on file    Active Member of [address] [place] [address] [employer] or Organizations: Not on file    Attends Club or Organization Meetings: Not on file    Marital Status: Not on file   Intimate Partner Violence:     Fear of Current or Ex-Partner: Not on file    Emotionally Abused: Not on file    Physically Abused: Not on file    Sexually Abused: Not on file   Housing Stability:     Unable to Pay for Housing in the Last Year: Not on file    Number of [place] in the Last Year: Not on file    Unstable Housing in the Last Year: Not on file       Current Outpatient Medications   Medication Sig Dispense Refill    dulaglutide (Trulicity) 1.5 HN/5.3 mL sub-q pen 0.5 mL by SubCUTAneous route every seven (7) days. Indications: type 2 diabetes mellitus 4 Each 1    atenoloL (TENORMIN) 50 mg tablet TAKE 1 TABLET BY MOUTH EVERY DAY 30 Tablet 0    atorvastatin (LIPITOR) 20 mg tablet TAKE 1 TABLET BY MOUTH EVERY DAY 30 Tablet 0    metFORMIN ER (GLUCOPHAGE XR) 500 mg tablet Take 1,000 mg by mouth two (2) times a day.  insulin glargine (Basaglar KwikPen U-100 Insulin) 100 unit/mL (3 mL) inpn 10 Units by SubCUTAneous route nightly for 30 days.  3 mL 0         Vitals:    05/31/22 1028   BP: 125/70   Pulse: 88   Resp: 16   Temp: 97.6 °F (36.4 °C)   TempSrc: Temporal   SpO2: 95%   Weight: 284 lb (128.8 kg)   Height: 5' 6\" (1.676 m)   PainSc:   6 Physical Exam  Vitals and nursing note reviewed. Constitutional:       General: She is not in acute distress. Appearance: Normal appearance. She is obese. She is not ill-appearing, toxic-appearing or diaphoretic. HENT:      Head: Normocephalic and atraumatic. Eyes:      General: No scleral icterus. Extraocular Movements: Extraocular movements intact. Conjunctiva/sclera: Conjunctivae normal.      Pupils: Pupils are equal, round, and reactive to light. Cardiovascular:      Rate and Rhythm: Normal rate and regular rhythm. Pulses: Normal pulses. Heart sounds: No murmur heard. Pulmonary:      Effort: Pulmonary effort is normal. No respiratory distress. Breath sounds: Normal breath sounds. No wheezing or rales. Abdominal:      General: Bowel sounds are normal. There is no distension. Palpations: Abdomen is soft. Tenderness: There is no abdominal tenderness. There is no guarding. Musculoskeletal:      Cervical back: Normal range of motion. Right lower leg: Edema present. Left lower leg: Edema present. Comments: Limited ROM due to back and knee pain  Trace LE edema   Skin:     General: Skin is warm and dry. Coloration: Skin is not jaundiced or pale. Neurological:      General: No focal deficit present. Mental Status: She is alert and oriented to person, place, and time. Mental status is at baseline. Cranial Nerves: No cranial nerve deficit. Motor: No weakness. Gait: Gait normal.   Psychiatric:         Mood and Affect: Mood normal.         Behavior: Behavior normal.         Thought Content:  Thought content normal.         Judgment: Judgment normal.       Diabetic foot exam: (5/3/22)    Left Foot:   Visual Exam: normal    Pulse DP: 2+ (normal)   Filament test: reduced sensation          Right Foot:   Visual Exam: normal    Pulse DP: 2+ (normal)   Filament test: reduced sensation            Office Visit on 05/03/2022 Component Date Value Ref Range Status    Hemoglobin A1c (POC) 05/03/2022 13.1  % Final   Admission on 04/08/2022, Discharged on 04/08/2022   Component Date Value Ref Range Status    WBC 04/08/2022 6.7  4.6 - 13.2 K/uL Final    RBC 04/08/2022 4.60  4.20 - 5.30 M/uL Final    HGB 04/08/2022 12.9  12.0 - 16.0 g/dL Final    HCT 04/08/2022 38.5  35.0 - 45.0 % Final    MCV 04/08/2022 83.7  78.0 - 100.0 FL Final    MCH 04/08/2022 28.0  24.0 - 34.0 PG Final    MCHC 04/08/2022 33.5  31.0 - 37.0 g/dL Final    RDW 04/08/2022 12.4  11.6 - 14.5 % Final    PLATELET 47/53/4260 813  135 - 420 K/uL Final    MPV 04/08/2022 10.5  9.2 - 11.8 FL Final    NRBC 04/08/2022 0.0  0  WBC Final    ABSOLUTE NRBC 04/08/2022 0.00  0.00 - 0.01 K/uL Final    NEUTROPHILS 04/08/2022 49  40 - 73 % Final    LYMPHOCYTES 04/08/2022 42  21 - 52 % Final    MONOCYTES 04/08/2022 7  3 - 10 % Final    EOSINOPHILS 04/08/2022 1  0 - 5 % Final    BASOPHILS 04/08/2022 1  0 - 2 % Final    IMMATURE GRANULOCYTES 04/08/2022 0  0.0 - 0.5 % Final    ABS. NEUTROPHILS 04/08/2022 3.3  1.8 - 8.0 K/UL Final    ABS. LYMPHOCYTES 04/08/2022 2.8  0.9 - 3.6 K/UL Final    ABS. MONOCYTES 04/08/2022 0.5  0.05 - 1.2 K/UL Final    ABS. EOSINOPHILS 04/08/2022 0.1  0.0 - 0.4 K/UL Final    ABS. BASOPHILS 04/08/2022 0.0  0.0 - 0.1 K/UL Final    ABS. IMM.  GRANS. 04/08/2022 0.0  0.00 - 0.04 K/UL Final    DF 04/08/2022 AUTOMATED    Final    Sodium 04/08/2022 135* 136 - 145 mmol/L Final    Potassium 04/08/2022 4.2  3.5 - 5.5 mmol/L Final    Chloride 04/08/2022 102  100 - 111 mmol/L Final    CO2 04/08/2022 26  21 - 32 mmol/L Final    Anion gap 04/08/2022 7  3.0 - 18 mmol/L Final    Glucose 04/08/2022 389* 74 - 99 mg/dL Final    BUN 04/08/2022 10  7.0 - 18 MG/DL Final    Creatinine 04/08/2022 0.63  0.6 - 1.3 MG/DL Final    BUN/Creatinine ratio 04/08/2022 16  12 - 20   Final    GFR est AA 04/08/2022 >60  >60 ml/min/1.73m2 Final    GFR est non-AA 04/08/2022 >60  >60 ml/min/1.73m2 Final    Comment: (NOTE)  Estimated GFR is calculated using the Modification of Diet in Renal   Disease (MDRD) Study equation, reported for both  Americans   (GFRAA) and non- Americans (GFRNA), and normalized to 1.73m2   body surface area. The physician must decide which value applies to   the patient. The MDRD study equation should only be used in   individuals age 25 or older. It has not been validated for the   following: pregnant women, patients with serious comorbid conditions,   or on certain medications, or persons with extremes of body size,   muscle mass, or nutritional status.  Calcium 04/08/2022 9.0  8.5 - 10.1 MG/DL Final    Bilirubin, total 04/08/2022 0.3  0.2 - 1.0 MG/DL Final    ALT (SGPT) 04/08/2022 16  13 - 56 U/L Final    AST (SGOT) 04/08/2022 12  10 - 38 U/L Final    Alk.  phosphatase 04/08/2022 132* 45 - 117 U/L Final    Protein, total 04/08/2022 7.5  6.4 - 8.2 g/dL Final    Albumin 04/08/2022 2.9* 3.4 - 5.0 g/dL Final    Globulin 04/08/2022 4.6* 2.0 - 4.0 g/dL Final    A-G Ratio 04/08/2022 0.6* 0.8 - 1.7   Final    Ventricular Rate 04/08/2022 101  BPM Final    Atrial Rate 04/08/2022 101  BPM Final    P-R Interval 04/08/2022 148  ms Final    QRS Duration 04/08/2022 94  ms Final    Q-T Interval 04/08/2022 352  ms Final    QTC Calculation (Bezet) 04/08/2022 456  ms Final    Calculated P Axis 04/08/2022 9  degrees Final    Calculated R Axis 04/08/2022 -9  degrees Final    Calculated T Axis 04/08/2022 48  degrees Final    Diagnosis 04/08/2022    Final                    Value:Sinus tachycardia  Possible Left atrial enlargement  Septal infarct , age undetermined  Abnormal ECG  No previous ECGs available  Confirmed by Connie Draper MD, Bethel Gaytan ([id number]) on 4/8/2022 4:55:37 PM      Glucose (POC) 04/08/2022 336* 70 - 110 mg/dL Final    Comment: Notified RN or MD immediately by   (NOTE)  The FDA has indicated that no capillary point of care blood glucose   monitoring systems are approved for use in \"critically ill\" patients,   however they have not defined this population. The College of   American Pathologists has recommended that these devices should not   be used in cases such as severe hypotension, dehydration, shock, and   hyperglycemic-hyperosmolar state, amongst others. Venous or arterial   collection is the recommended specimen for testing these patients.  Color 04/08/2022 YELLOW    Final    Appearance 04/08/2022 CLEAR    Final    Specific gravity 04/08/2022 >1.030* 1.005 - 1.030 Final    pH (UA) 04/08/2022 6.0  5.0 - 8.0   Final    Protein 04/08/2022 Negative  NEG mg/dL Final    Glucose 04/08/2022 >1,000* NEG mg/dL Final    Ketone 04/08/2022 80* NEG mg/dL Final    Bilirubin 04/08/2022 Negative  NEG   Final    Blood 04/08/2022 Negative  NEG   Final    Urobilinogen 04/08/2022 1.0  0.2 - 1.0 EU/dL Final    Nitrites 04/08/2022 Negative  NEG   Final    Leukocyte Esterase 04/08/2022 Negative  NEG   Final    Glucose (POC) 04/08/2022 264* 70 - 110 mg/dL Final    Comment: (NOTE)  The FDA has indicated that no capillary point of care blood glucose   monitoring systems are approved for use in \"critically ill\" patients,   however they have not defined this population. The College of   American Pathologists has recommended that these devices should not   be used in cases such as severe hypotension, dehydration, shock, and   hyperglycemic-hyperosmolar state, amongst others. Venous or arterial   collection is the recommended specimen for testing these patients. No results found for any visits on 05/31/22. Patient Care Team:  Patient Care Team:  Javan Latif MD as PCP - General (Internal Medicine Physician)  Javan Latif MD as PCP - [facility] Leela Leyva Provider      Assessment / Plan:      ICD-10-CM ICD-9-CM    1.  Type 2 diabetes mellitus with hyperglycemia, unspecified whether long term insulin use (HCC)  E11.65 250.00 AMB POC HEMOGLOBIN A1C      dulaglutide (Trulicity) 1.5 BC/8.7 mL sub-q pen   2. Essential hypertension  I10 401.9    3. Hyperlipidemia, unspecified hyperlipidemia type  E78.5 272.4    4. Screening for colon cancer  Z12.11 V76.51 REFERRAL TO GASTROENTEROLOGY   5. Screening for cervical cancer  Z12.4 V76.2 REFERRAL TO GYNECOLOGY   6. Chronic pain of both knees  M25.561 719.46 REFERRAL TO ORTHOPEDICS    M25.562 338.29     G89.29       Labs are pending. T2DM: Improving. HbA1c is 11.6% today from 13% previously. Continue metformin, Lantus 10 units nightly. Increase dosage of Trulicity. Foot exam with reduced sensation bilaterally. Check urine microalbumin - may consider starting on low dose ARB next visit. Will request records for recent ophthalmology visit. HLD: Continue statin: Check lipid panel. HTN: BP acceptable. Continue atenolol. Bilateral knee pain: Likely due to OA. Refer to orthopedics. Obesity: Counseled on diet and exercise. Mammogram showed a breast nodule. Diagnostic mammogram has been ordered. Refer to GI for colon cancer screening. Due for pap smear:  Patient wants to establish with OB/GYN. Referred. Refused pneumonia vaccine. Has received COVID-19 vaccine X2 doses. Advised to get the booster as soon as possible. Follow-up and Dispositions    · Return in about 4 weeks (around 6/28/2022) for Diabetes F/U, 15 mins. I asked the patient if she  had any questions and answered her  questions. The patient stated that she understands the treatment plan and agrees with the treatment plan    This document was created with a voice activated dictation system and may contain transcription errors.

## 2022-06-01 LAB
HCV AB SER IA-ACNC: 0.06 INDEX
HCV AB SERPL QL IA: NEGATIVE
HCV COMMENT,HCGAC: NORMAL

## 2022-06-01 NOTE — PROGRESS NOTES
Two pt identifiers verified. Pt was given lab results and states understanding with no further questions or concerns.

## 2022-06-28 ENCOUNTER — OFFICE VISIT (OUTPATIENT)
Dept: FAMILY MEDICINE CLINIC | Age: 52
End: 2022-06-28
Payer: COMMERCIAL

## 2022-06-28 VITALS
DIASTOLIC BLOOD PRESSURE: 75 MMHG | HEIGHT: 66 IN | OXYGEN SATURATION: 95 % | RESPIRATION RATE: 18 BRPM | BODY MASS INDEX: 45.32 KG/M2 | SYSTOLIC BLOOD PRESSURE: 125 MMHG | WEIGHT: 282 LBS | TEMPERATURE: 98.4 F | HEART RATE: 87 BPM

## 2022-06-28 DIAGNOSIS — R06.02 SHORTNESS OF BREATH: ICD-10-CM

## 2022-06-28 DIAGNOSIS — K21.9 GASTROESOPHAGEAL REFLUX DISEASE WITHOUT ESOPHAGITIS: ICD-10-CM

## 2022-06-28 DIAGNOSIS — I10 ESSENTIAL HYPERTENSION: ICD-10-CM

## 2022-06-28 DIAGNOSIS — R80.9 MICROALBUMINURIA: ICD-10-CM

## 2022-06-28 DIAGNOSIS — E11.65 TYPE 2 DIABETES MELLITUS WITH HYPERGLYCEMIA, UNSPECIFIED WHETHER LONG TERM INSULIN USE (HCC): Primary | ICD-10-CM

## 2022-06-28 LAB — HBA1C MFR BLD HPLC: 10.2 %

## 2022-06-28 PROCEDURE — 3046F HEMOGLOBIN A1C LEVEL >9.0%: CPT | Performed by: STUDENT IN AN ORGANIZED HEALTH CARE EDUCATION/TRAINING PROGRAM

## 2022-06-28 PROCEDURE — 83036 HEMOGLOBIN GLYCOSYLATED A1C: CPT | Performed by: STUDENT IN AN ORGANIZED HEALTH CARE EDUCATION/TRAINING PROGRAM

## 2022-06-28 PROCEDURE — 99214 OFFICE O/P EST MOD 30 MIN: CPT | Performed by: STUDENT IN AN ORGANIZED HEALTH CARE EDUCATION/TRAINING PROGRAM

## 2022-06-28 RX ORDER — ALBUTEROL SULFATE 90 UG/1
1 AEROSOL, METERED RESPIRATORY (INHALATION)
Qty: 18 G | Refills: 1 | Status: SHIPPED | OUTPATIENT
Start: 2022-06-28 | End: 2022-08-04 | Stop reason: SDUPTHER

## 2022-06-28 RX ORDER — PANTOPRAZOLE SODIUM 40 MG/1
40 TABLET, DELAYED RELEASE ORAL DAILY
Qty: 30 TABLET | Refills: 2 | Status: SHIPPED | OUTPATIENT
Start: 2022-06-28 | End: 2022-07-13

## 2022-06-28 RX ORDER — LISINOPRIL 5 MG/1
5 TABLET ORAL DAILY
Qty: 30 TABLET | Refills: 2 | Status: SHIPPED | OUTPATIENT
Start: 2022-06-28 | End: 2022-07-13

## 2022-06-28 RX ORDER — DULAGLUTIDE 3 MG/.5ML
3 INJECTION, SOLUTION SUBCUTANEOUS
Qty: 4 EACH | Refills: 1 | Status: SHIPPED | OUTPATIENT
Start: 2022-06-28 | End: 2022-08-04

## 2022-06-28 NOTE — PROGRESS NOTES
Solange Severino is a 45 y.o. female presenting today for Follow-up  . Chief Complaint   Patient presents with    Follow-up       HPI:  Solange Severino presents to the office today for follow up. Patient has a PMHx of T2DM, HTN, HLD.    T2DM: Patient is on Lantus 10 units nightly along with metformin 1000 mg twice daily. Is now also on Trulicity. She reported GI symptoms - diarrhea with metformin that improved once she switched to the ER formulation. Reports numbness/tingling in her bilateral feet along with shooting pain. She has tried gabapentin in the past-reported side effects so she stopped taking it. Last HbA1c was 11.6%  Home FBS ranging 180-250s. Noted to have microalbuminuria. She had an eye exam recently 3 months ago - she was informed she had cataracts. Knee Pain: Patient reporting bilateral knee pain. Denies any injury. Has been occurring since March 22. She has tried Tylenol arthritis with no relief. Motrin helps intermittently. She was referred to orthopedics and has an appointment scheduled in July 22. HTN: Controlled on atenolol. HLD: She was started back on atorvastatin. LDL in 5/22 was 110    Healthcare maintenance:  Mammogram in 5/22 showed a nodule and left breast.  Diagnostic mammogram and ultrasound breast were ordered. Referred to GI for colon cancer screening  Due for Pap smear    Today, reports intermittent shortness of breath and wheezing usually occurs after having a meal.  Also reports heartburn. Review of Systems   Constitutional: Negative for chills, diaphoresis, fever, malaise/fatigue and weight loss. HENT: Negative for congestion, ear discharge, ear pain, hearing loss, nosebleeds, sinus pain, sore throat and tinnitus. Eyes: Negative for blurred vision, double vision and photophobia. Respiratory: Positive for shortness of breath. Negative for cough, sputum production, wheezing and stridor. Cardiovascular: Positive for palpitations.  Negative for chest pain, orthopnea, claudication and leg swelling. Gastrointestinal: Positive for heartburn. Negative for abdominal pain, blood in stool, constipation, diarrhea, melena, nausea and vomiting. Genitourinary: Positive for frequency. Negative for dysuria, flank pain, hematuria and urgency. Musculoskeletal: Positive for back pain and joint pain. Negative for myalgias and neck pain. Skin: Negative for rash. Neurological: Positive for tingling and sensory change. Negative for tremors, speech change, focal weakness, seizures, weakness and headaches. Psychiatric/Behavioral: Negative for depression. The patient is not nervous/anxious. All other systems reviewed and are negative. Allergies   Allergen Reactions    Codeine Itching       PHQ Screening   3 most recent PHQ Screens 6/28/2022   Little interest or pleasure in doing things Not at all   Feeling down, depressed, irritable, or hopeless Not at all   Total Score PHQ 2 0   Trouble falling or staying asleep, or sleeping too much -   Feeling tired or having little energy -   Poor appetite, weight loss, or overeating -   Feeling bad about yourself - or that you are a failure or have let yourself or your family down -   Trouble concentrating on things such as school, work, reading, or watching TV -   Moving or speaking so slowly that other people could have noticed; or the opposite being so fidgety that others notice -   Thoughts of being better off dead, or hurting yourself in some way -   PHQ 9 Score -   How difficult have these problems made it for you to do your work, take care of your home and get along with others -       History  Past Medical History:   Diagnosis Date    Arthritis     Diabetes ([facility] [place] [address]        No past surgical history on file.     Social History     Socioeconomic History    Marital status:      Spouse name: Not on file    Number of children: Not on file    Years of education: Not on file    Highest education level: Not on file   Occupational History    Not on file   Tobacco Use    Smoking status: Never Smoker    Smokeless tobacco: Never Used   Vaping Use    Vaping Use: Never used   Substance and Sexual Activity    Alcohol use: Yes    Drug use: Never    Sexual activity: Never   Other Topics Concern    Not on file   Social History Narrative    Not on file     Social Determinants of Health     Financial Resource Strain:     Difficulty of Paying Living Expenses: Not on file   Food Insecurity:     Worried About Running Out of Food in the Last Year: Not on file    Koby of Food in the Last Year: Not on file   Transportation Needs:     Lack of Transportation (Medical): Not on file    Lack of Transportation (Non-Medical): Not on file   Physical Activity:     Days of Exercise per Week: Not on file    Minutes of Exercise per Session: Not on file   Stress:     Feeling of Stress : Not on file   Social Connections:     Frequency of Communication with Friends and Family: Not on file    Frequency of Social Gatherings with Friends and Family: Not on file    Attends Bahai Services: Not on file    Active Member of [address] [place] [address] or Organizations: Not on file    Attends Club or Organization Meetings: Not on file    Marital Status: Not on file   Intimate Partner Violence:     Fear of Current or Ex-Partner: Not on file    Emotionally Abused: Not on file    Physically Abused: Not on file    Sexually Abused: Not on file   Housing Stability:     Unable to Pay for Housing in the Last Year: Not on file    Number of [place] in the Last Year: Not on file    Unstable Housing in the Last Year: Not on file       Current Outpatient Medications   Medication Sig Dispense Refill    albuterol (PROVENTIL HFA, VENTOLIN HFA, PROAIR HFA) 90 mcg/actuation inhaler Take 1 Puff by inhalation every four (4) hours as needed for Wheezing or Shortness of Breath.  Indications: bronchospasm 18 g 1    lisinopriL (PRINIVIL, ZESTRIL) 5 mg tablet Take 1 Tablet by mouth daily. 30 Tablet 2    pantoprazole (PROTONIX) 40 mg tablet Take 1 Tablet by mouth daily. 30 Tablet 2    dulaglutide (Trulicity) 3 VH/1.5 mL pnij 3 mg by SubCUTAneous route every seven (7) days. 4 Each 1    atenoloL (TENORMIN) 50 mg tablet TAKE 1 TABLET BY MOUTH EVERY DAY 30 Tablet 3    atorvastatin (LIPITOR) 20 mg tablet TAKE 1 TABLET BY MOUTH EVERY DAY 30 Tablet 3    metFORMIN ER (GLUCOPHAGE XR) 500 mg tablet Take 1,000 mg by mouth two (2) times a day. Vitals:    06/28/22 0805   BP: 125/75   Pulse: 87   Resp: 18   Temp: 98.4 °F (36.9 °C)   TempSrc: Temporal   SpO2: 95%   Weight: 282 lb (127.9 kg)   Height: 5' 6\" (1.676 m)   PainSc:   5   PainLoc: Knee       Physical Exam  Vitals and nursing note reviewed. Constitutional:       General: She is not in acute distress. Appearance: Normal appearance. She is obese. She is not ill-appearing, toxic-appearing or diaphoretic. HENT:      Head: Normocephalic and atraumatic. Eyes:      General: No scleral icterus. Extraocular Movements: Extraocular movements intact. Conjunctiva/sclera: Conjunctivae normal.      Pupils: Pupils are equal, round, and reactive to light. Cardiovascular:      Rate and Rhythm: Normal rate and regular rhythm. Pulses: Normal pulses. Heart sounds: No murmur heard. Pulmonary:      Effort: Pulmonary effort is normal. No respiratory distress. Breath sounds: Normal breath sounds. No wheezing or rales. Abdominal:      General: Bowel sounds are normal. There is no distension. Palpations: Abdomen is soft. Tenderness: There is no abdominal tenderness. There is no guarding. Musculoskeletal:      Cervical back: Normal range of motion. Right lower leg: Edema present. Left lower leg: Edema present. Comments: Limited ROM due to back and knee pain  Trace LE edema   Skin:     General: Skin is warm and dry. Coloration: Skin is not jaundiced or pale. Neurological:      General: No focal deficit present. Mental Status: She is alert and oriented to person, place, and time. Mental status is at baseline. Cranial Nerves: No cranial nerve deficit. Motor: No weakness. Gait: Gait normal.   Psychiatric:         Mood and Affect: Mood normal.         Behavior: Behavior normal.         Thought Content: Thought content normal.         Judgment: Judgment normal.       Diabetic foot exam: (5/3/22)    Left Foot:   Visual Exam: normal    Pulse DP: 2+ (normal)   Filament test: reduced sensation          Right Foot:   Visual Exam: normal    Pulse DP: 2+ (normal)   Filament test: reduced sensation            Office Visit on 06/28/2022   Component Date Value Ref Range Status    Hemoglobin A1c (POC) 06/28/2022 10.2  % Final   Hospital Outpatient Visit on 05/31/2022   Component Date Value Ref Range Status    LIPID PROFILE 05/31/2022        Final    Cholesterol, total 05/31/2022 175  <200 MG/DL Final    Triglyceride 05/31/2022 96  <150 MG/DL Final    Comment: The drugs N-acetylcysteine (NAC) and  Metamiszole have been found to cause falsely  low results in this chemical assay. Please  be sure to submit blood samples obtained  BEFORE administration of either of these  drugs to assure correct results.       HDL Cholesterol 05/31/2022 45  40 - 60 MG/DL Final    LDL, calculated 05/31/2022 110.8* 0 - 100 MG/DL Final    VLDL, calculated 05/31/2022 19.2  MG/DL Final    CHOL/HDL Ratio 05/31/2022 3.9  0 - 5.0   Final    TSH 05/31/2022 0.76  0.36 - 3.74 uIU/mL Final    Microalbumin,urine random 05/31/2022 6.56* 0 - 3.0 MG/DL Final    Creatinine, urine 05/31/2022 154.00* 30 - 125 mg/dL Final    Microalbumin/Creat ratio (mg/g cre* 05/31/2022 43* 0 - 30 mg/g Final    Hepatitis C virus Ab 05/31/2022 0.06  <0.80 Index Final    Hep C virus Ab Interp. 05/31/2022 Negative  NEG   Final    Hep C  virus Ab comment 05/31/2022        Final    Comment: Index <0. 80.......................... Quinton Ortiz Negative  Index > or = to 0.80 and <1.00. .... Quinton Ortiz Equivocal  Index >1.00. ......................... Quinton Ortiz Positive          For Equivocal or Positive results, confirmation with Hepatitis C RNA by PCR or bDNA is suggested. Office Visit on 05/31/2022   Component Date Value Ref Range Status    Hemoglobin A1c (POC) 05/31/2022 11.6  % Final   Office Visit on 05/03/2022   Component Date Value Ref Range Status    Hemoglobin A1c (POC) 05/03/2022 13.1  % Final   Admission on 04/08/2022, Discharged on 04/08/2022   Component Date Value Ref Range Status    WBC 04/08/2022 6.7  4.6 - 13.2 K/uL Final    RBC 04/08/2022 4.60  4.20 - 5.30 M/uL Final    HGB 04/08/2022 12.9  12.0 - 16.0 g/dL Final    HCT 04/08/2022 38.5  35.0 - 45.0 % Final    MCV 04/08/2022 83.7  78.0 - 100.0 FL Final    MCH 04/08/2022 28.0  24.0 - 34.0 PG Final    MCHC 04/08/2022 33.5  31.0 - 37.0 g/dL Final    RDW 04/08/2022 12.4  11.6 - 14.5 % Final    PLATELET 91/81/1686 600  135 - 420 K/uL Final    MPV 04/08/2022 10.5  9.2 - 11.8 FL Final    NRBC 04/08/2022 0.0  0  WBC Final    ABSOLUTE NRBC 04/08/2022 0.00  0.00 - 0.01 K/uL Final    NEUTROPHILS 04/08/2022 49  40 - 73 % Final    LYMPHOCYTES 04/08/2022 42  21 - 52 % Final    MONOCYTES 04/08/2022 7  3 - 10 % Final    EOSINOPHILS 04/08/2022 1  0 - 5 % Final    BASOPHILS 04/08/2022 1  0 - 2 % Final    IMMATURE GRANULOCYTES 04/08/2022 0  0.0 - 0.5 % Final    ABS. NEUTROPHILS 04/08/2022 3.3  1.8 - 8.0 K/UL Final    ABS. LYMPHOCYTES 04/08/2022 2.8  0.9 - 3.6 K/UL Final    ABS. MONOCYTES 04/08/2022 0.5  0.05 - 1.2 K/UL Final    ABS. EOSINOPHILS 04/08/2022 0.1  0.0 - 0.4 K/UL Final    ABS. BASOPHILS 04/08/2022 0.0  0.0 - 0.1 K/UL Final    ABS. IMM.  GRANS. 04/08/2022 0.0  0.00 - 0.04 K/UL Final    DF 04/08/2022 AUTOMATED    Final    Sodium 04/08/2022 135* 136 - 145 mmol/L Final    Potassium 04/08/2022 4.2  3.5 - 5.5 mmol/L Final    Chloride 04/08/2022 102  100 - 111 mmol/L Final    CO2 04/08/2022 26  21 - 32 mmol/L Final    Anion gap 04/08/2022 7  3.0 - 18 mmol/L Final    Glucose 04/08/2022 389* 74 - 99 mg/dL Final    BUN 04/08/2022 10  7.0 - 18 MG/DL Final    Creatinine 04/08/2022 0.63  0.6 - 1.3 MG/DL Final    BUN/Creatinine ratio 04/08/2022 16  12 - 20   Final    GFR est AA 04/08/2022 >60  >60 ml/min/1.73m2 Final    GFR est non-AA 04/08/2022 >60  >60 ml/min/1.73m2 Final    Comment: (NOTE)  Estimated GFR is calculated using the Modification of Diet in Renal   Disease (MDRD) Study equation, reported for both  Americans   (GFRAA) and non- Americans (GFRNA), and normalized to 1.73m2   body surface area. The physician must decide which value applies to   the patient. The MDRD study equation should only be used in   individuals age 25 or older. It has not been validated for the   following: pregnant women, patients with serious comorbid conditions,   or on certain medications, or persons with extremes of body size,   muscle mass, or nutritional status.  Calcium 04/08/2022 9.0  8.5 - 10.1 MG/DL Final    Bilirubin, total 04/08/2022 0.3  0.2 - 1.0 MG/DL Final    ALT (SGPT) 04/08/2022 16  13 - 56 U/L Final    AST (SGOT) 04/08/2022 12  10 - 38 U/L Final    Alk.  phosphatase 04/08/2022 132* 45 - 117 U/L Final    Protein, total 04/08/2022 7.5  6.4 - 8.2 g/dL Final    Albumin 04/08/2022 2.9* 3.4 - 5.0 g/dL Final    Globulin 04/08/2022 4.6* 2.0 - 4.0 g/dL Final    A-G Ratio 04/08/2022 0.6* 0.8 - 1.7   Final    Ventricular Rate 04/08/2022 101  BPM Final    Atrial Rate 04/08/2022 101  BPM Final    P-R Interval 04/08/2022 148  ms Final    QRS Duration 04/08/2022 94  ms Final    Q-T Interval 04/08/2022 352  ms Final    QTC Calculation (Bezet) 04/08/2022 456  ms Final    Calculated P Axis 04/08/2022 9  degrees Final    Calculated R Axis 04/08/2022 -9  degrees Final    Calculated T Axis 04/08/2022 48  degrees Final    Diagnosis 04/08/2022 Final                    Value:Sinus tachycardia  Possible Left atrial enlargement  Septal infarct , age undetermined  Abnormal ECG  No previous ECGs available  Confirmed by Lokesh Jones MD, Donnise Form ([id number]) on 4/8/2022 4:55:37 PM      Glucose (POC) 04/08/2022 336* 70 - 110 mg/dL Final    Comment: Notified RN or MD immediately by   (NOTE)  The FDA has indicated that no capillary point of care blood glucose   monitoring systems are approved for use in \"critically ill\" patients,   however they have not defined this population. The College of   American Pathologists has recommended that these devices should not   be used in cases such as severe hypotension, dehydration, shock, and   hyperglycemic-hyperosmolar state, amongst others. Venous or arterial   collection is the recommended specimen for testing these patients.  Color 04/08/2022 YELLOW    Final    Appearance 04/08/2022 CLEAR    Final    Specific gravity 04/08/2022 >1.030* 1.005 - 1.030 Final    pH (UA) 04/08/2022 6.0  5.0 - 8.0   Final    Protein 04/08/2022 Negative  NEG mg/dL Final    Glucose 04/08/2022 >1,000* NEG mg/dL Final    Ketone 04/08/2022 80* NEG mg/dL Final    Bilirubin 04/08/2022 Negative  NEG   Final    Blood 04/08/2022 Negative  NEG   Final    Urobilinogen 04/08/2022 1.0  0.2 - 1.0 EU/dL Final    Nitrites 04/08/2022 Negative  NEG   Final    Leukocyte Esterase 04/08/2022 Negative  NEG   Final    Glucose (POC) 04/08/2022 264* 70 - 110 mg/dL Final    Comment: (NOTE)  The FDA has indicated that no capillary point of care blood glucose   monitoring systems are approved for use in \"critically ill\" patients,   however they have not defined this population. The College of   American Pathologists has recommended that these devices should not   be used in cases such as severe hypotension, dehydration, shock, and   hyperglycemic-hyperosmolar state, amongst others.   Venous or arterial   collection is the recommended specimen for testing these patients. Results for orders placed or performed in visit on 06/28/22   AMB POC HEMOGLOBIN A1C   Result Value Ref Range    Hemoglobin A1c (POC) 10.2 %       Patient Care Team:  Patient Care Team:  Adele Hayes MD as PCP - General (Internal Medicine Physician)  Adele Hayes MD as PCP - [facility]      Assessment / Plan:      ICD-10-CM ICD-9-CM    1. Type 2 diabetes mellitus with hyperglycemia, unspecified whether long term insulin use (HCC)  E11.65 250.00 AMB POC HEMOGLOBIN A1C      dulaglutide (Trulicity) 3 MH/4.8 mL pnij   2. Essential hypertension  I10 401.9    3. Microalbuminuria  R80.9 791.0 lisinopriL (PRINIVIL, ZESTRIL) 5 mg tablet   4. Gastroesophageal reflux disease without esophagitis  K21.9 530.81 pantoprazole (PROTONIX) 40 mg tablet   5. Shortness of breath  R06.02 786.05 albuterol (PROVENTIL HFA, VENTOLIN HFA, PROAIR HFA) 90 mcg/actuation inhaler     Labs reviewed and discussed with patient. T2DM: Improving. HbA1c 13 >>11.6 >> 10.2%. Continue metformin, Lantus 10 units nightly. Increase dosage of Trulicity to 3 mg  Foot exam with reduced sensation bilaterally. Does not want to take gabapentin or Lyrica. Check urine microalbumin -add lisinopril 5 mg daily. Ophthalmology records requested for recent eye exam.    HLD: Continue statin    HTN: BP acceptable. Continue atenolol. Bilateral knee pain: Likely due to OA. Has an appointment scheduled with orthopedics. Obesity: Counseled on diet and exercise. GERD: Could be cause of SOB/wheezing after meals. Start Protonix. Mammogram showed a breast nodule. Diagnostic mammogram has been ordered. Referred to GI for colon cancer screening. Due for pap smear:  Referred to OBGYN. Has an appt next week. Refused pneumonia vaccine. Has received COVID-19 vaccine X2 doses. Advised to get the booster as soon as possible.     Follow-up and Dispositions    · Return in about 4 weeks (around 7/26/2022) for Diabetes F/U, 15 mins. I asked the patient if she  had any questions and answered her  questions. The patient stated that she understands the treatment plan and agrees with the treatment plan    This document was created with a voice activated dictation system and may contain transcription errors.

## 2022-07-08 ENCOUNTER — OFFICE VISIT (OUTPATIENT)
Dept: ORTHOPEDIC SURGERY | Age: 52
End: 2022-07-08
Payer: COMMERCIAL

## 2022-07-08 VITALS
HEIGHT: 66 IN | BODY MASS INDEX: 45.8 KG/M2 | DIASTOLIC BLOOD PRESSURE: 84 MMHG | HEART RATE: 90 BPM | SYSTOLIC BLOOD PRESSURE: 127 MMHG | WEIGHT: 285 LBS | TEMPERATURE: 97.2 F | OXYGEN SATURATION: 96 %

## 2022-07-08 DIAGNOSIS — M22.2X2 PATELLOFEMORAL SYNDROME, BILATERAL: Primary | ICD-10-CM

## 2022-07-08 DIAGNOSIS — M65.331 TRIGGER FINGER, RIGHT MIDDLE FINGER: ICD-10-CM

## 2022-07-08 DIAGNOSIS — M22.2X1 PATELLOFEMORAL SYNDROME, BILATERAL: Primary | ICD-10-CM

## 2022-07-08 PROCEDURE — 99204 OFFICE O/P NEW MOD 45 MIN: CPT | Performed by: FAMILY MEDICINE

## 2022-07-08 RX ORDER — DICLOFENAC SODIUM 10 MG/G
2 GEL TOPICAL
Qty: 100 G | Refills: 1 | Status: SHIPPED | OUTPATIENT
Start: 2022-07-08 | End: 2022-08-30

## 2022-07-08 RX ORDER — LANCETS 33 GAUGE
EACH MISCELLANEOUS
COMMUNITY
Start: 2022-06-28

## 2022-07-08 RX ORDER — BLOOD SUGAR DIAGNOSTIC
STRIP MISCELLANEOUS
COMMUNITY
Start: 2022-06-28

## 2022-07-08 RX ORDER — DICLOFENAC SODIUM 50 MG/1
50 TABLET, DELAYED RELEASE ORAL 2 TIMES DAILY WITH MEALS
Qty: 60 TABLET | Refills: 1 | Status: SHIPPED | OUTPATIENT
Start: 2022-07-08 | End: 2022-08-30

## 2022-07-08 NOTE — LETTER
7/8/2022    Patient: Roland Wood   YOB: 1970   Date of Visit: 7/8/2022     Ilan Agosto MD  [address]    Dear Ilan Agosto MD,      Thank you for referring Ms. Gumaro Elena to [place] [id number]. for evaluation. My notes for this consultation are attached. If you have questions, please do not hesitate to call me. I look forward to following your patient along with you.       Sincerely,    Dave Hua, DO

## 2022-07-09 DIAGNOSIS — I10 ESSENTIAL HYPERTENSION: ICD-10-CM

## 2022-07-09 DIAGNOSIS — R80.9 MICROALBUMINURIA: ICD-10-CM

## 2022-07-09 DIAGNOSIS — K21.9 GASTROESOPHAGEAL REFLUX DISEASE WITHOUT ESOPHAGITIS: ICD-10-CM

## 2022-07-09 DIAGNOSIS — E78.5 HYPERLIPIDEMIA, UNSPECIFIED HYPERLIPIDEMIA TYPE: ICD-10-CM

## 2022-07-12 ENCOUNTER — TELEPHONE (OUTPATIENT)
Dept: ORTHOPEDIC SURGERY | Age: 52
End: 2022-07-12

## 2022-07-12 NOTE — TELEPHONE ENCOUNTER
PA is required for Diclofenac gel    Cover My Meds Key:  Geovanny [address] in place:    Recommendation Provided To:    Intervention Detail: New Rx: 1, reason: Patient Preference   Gap Closed?:    Intervention Accepted By:   Laureano Time Spent (min): 5

## 2022-07-13 RX ORDER — ATENOLOL 50 MG/1
TABLET ORAL
Qty: 90 TABLET | Refills: 1 | Status: SHIPPED | OUTPATIENT
Start: 2022-07-13

## 2022-07-13 RX ORDER — LISINOPRIL 5 MG/1
TABLET ORAL
Qty: 90 TABLET | Refills: 1 | Status: SHIPPED | OUTPATIENT
Start: 2022-07-13

## 2022-07-13 RX ORDER — PANTOPRAZOLE SODIUM 40 MG/1
TABLET, DELAYED RELEASE ORAL
Qty: 90 TABLET | Refills: 1 | Status: SHIPPED | OUTPATIENT
Start: 2022-07-13 | End: 2022-08-04 | Stop reason: SDUPTHER

## 2022-07-13 RX ORDER — ATORVASTATIN CALCIUM 20 MG/1
TABLET, FILM COATED ORAL
Qty: 90 TABLET | Refills: 1 | Status: SHIPPED | OUTPATIENT
Start: 2022-07-13

## 2022-07-14 NOTE — TELEPHONE ENCOUNTER
PA Denied. Spoke with patient and she stated if the Diclofenac Gel is too expensive she is going to try OTC options such as Biofreeze or an Arthritis Cream. Patient agreed and verbalized understanding.

## 2022-07-15 ENCOUNTER — TELEPHONE (OUTPATIENT)
Dept: FAMILY MEDICINE CLINIC | Age: 52
End: 2022-07-15

## 2022-07-15 DIAGNOSIS — E11.65 TYPE 2 DIABETES MELLITUS WITH HYPERGLYCEMIA, UNSPECIFIED WHETHER LONG TERM INSULIN USE (HCC): Primary | ICD-10-CM

## 2022-07-19 ENCOUNTER — APPOINTMENT (OUTPATIENT)
Dept: MAMMOGRAPHY | Age: 52
End: 2022-07-19
Attending: STUDENT IN AN ORGANIZED HEALTH CARE EDUCATION/TRAINING PROGRAM

## 2022-07-19 ENCOUNTER — APPOINTMENT (OUTPATIENT)
Dept: ULTRASOUND IMAGING | Age: 52
End: 2022-07-19
Attending: STUDENT IN AN ORGANIZED HEALTH CARE EDUCATION/TRAINING PROGRAM

## 2022-07-20 RX ORDER — INSULIN GLARGINE 100 [IU]/ML
10 INJECTION, SOLUTION SUBCUTANEOUS
Qty: 3 ML | Refills: 1 | Status: SHIPPED | OUTPATIENT
Start: 2022-07-20 | End: 2022-08-04 | Stop reason: SDUPTHER

## 2022-07-20 RX ORDER — PEN NEEDLE, DIABETIC 30 GX3/16"
NEEDLE, DISPOSABLE MISCELLANEOUS
Qty: 1 EACH | Refills: 11 | Status: SHIPPED | OUTPATIENT
Start: 2022-07-20 | End: 2022-08-04 | Stop reason: SDUPTHER

## 2022-07-21 ENCOUNTER — APPOINTMENT (OUTPATIENT)
Dept: PHYSICAL THERAPY | Age: 52
End: 2022-07-21

## 2022-08-02 ENCOUNTER — HOSPITAL ENCOUNTER (OUTPATIENT)
Dept: ULTRASOUND IMAGING | Age: 52
Discharge: HOME | End: 2022-08-02
Attending: STUDENT IN AN ORGANIZED HEALTH CARE EDUCATION/TRAINING PROGRAM
Payer: COMMERCIAL

## 2022-08-02 ENCOUNTER — HOSPITAL ENCOUNTER (OUTPATIENT)
Dept: MAMMOGRAPHY | Age: 52
Discharge: HOME | End: 2022-08-02
Attending: STUDENT IN AN ORGANIZED HEALTH CARE EDUCATION/TRAINING PROGRAM
Payer: COMMERCIAL

## 2022-08-02 DIAGNOSIS — R92.8 ABNORMAL MAMMOGRAM OF LEFT BREAST: ICD-10-CM

## 2022-08-02 PROCEDURE — 77061 BREAST TOMOSYNTHESIS UNI: CPT

## 2022-08-02 PROCEDURE — 76642 ULTRASOUND BREAST LIMITED: CPT

## 2022-08-04 ENCOUNTER — OFFICE VISIT (OUTPATIENT)
Dept: FAMILY MEDICINE CLINIC | Age: 52
End: 2022-08-04
Payer: COMMERCIAL

## 2022-08-04 VITALS
SYSTOLIC BLOOD PRESSURE: 132 MMHG | RESPIRATION RATE: 16 BRPM | DIASTOLIC BLOOD PRESSURE: 78 MMHG | HEIGHT: 66 IN | OXYGEN SATURATION: 98 % | TEMPERATURE: 98.3 F | BODY MASS INDEX: 45.96 KG/M2 | HEART RATE: 79 BPM | WEIGHT: 286 LBS

## 2022-08-04 DIAGNOSIS — N63.0 BREAST NODULE: ICD-10-CM

## 2022-08-04 DIAGNOSIS — R06.02 SHORTNESS OF BREATH: ICD-10-CM

## 2022-08-04 DIAGNOSIS — K21.9 GASTROESOPHAGEAL REFLUX DISEASE WITHOUT ESOPHAGITIS: ICD-10-CM

## 2022-08-04 DIAGNOSIS — R80.9 MICROALBUMINURIA: ICD-10-CM

## 2022-08-04 DIAGNOSIS — I10 ESSENTIAL HYPERTENSION: ICD-10-CM

## 2022-08-04 DIAGNOSIS — E11.65 TYPE 2 DIABETES MELLITUS WITH HYPERGLYCEMIA, UNSPECIFIED WHETHER LONG TERM INSULIN USE (HCC): Primary | ICD-10-CM

## 2022-08-04 PROCEDURE — 3046F HEMOGLOBIN A1C LEVEL >9.0%: CPT | Performed by: STUDENT IN AN ORGANIZED HEALTH CARE EDUCATION/TRAINING PROGRAM

## 2022-08-04 PROCEDURE — 99214 OFFICE O/P EST MOD 30 MIN: CPT | Performed by: STUDENT IN AN ORGANIZED HEALTH CARE EDUCATION/TRAINING PROGRAM

## 2022-08-04 RX ORDER — ALBUTEROL SULFATE 90 UG/1
1 AEROSOL, METERED RESPIRATORY (INHALATION)
Qty: 18 G | Refills: 2 | Status: SHIPPED | OUTPATIENT
Start: 2022-08-04

## 2022-08-04 RX ORDER — DULAGLUTIDE 3 MG/.5ML
3 INJECTION, SOLUTION SUBCUTANEOUS
Qty: 4 EACH | Refills: 1 | Status: CANCELLED | OUTPATIENT
Start: 2022-08-04

## 2022-08-04 RX ORDER — PEN NEEDLE, DIABETIC 30 GX3/16"
NEEDLE, DISPOSABLE MISCELLANEOUS
Qty: 50 EACH | Refills: 5 | Status: SHIPPED | OUTPATIENT
Start: 2022-08-04

## 2022-08-04 RX ORDER — DULAGLUTIDE 4.5 MG/.5ML
4.5 INJECTION, SOLUTION SUBCUTANEOUS
Qty: 4 EACH | Refills: 3 | Status: SHIPPED | OUTPATIENT
Start: 2022-08-04

## 2022-08-04 RX ORDER — PANTOPRAZOLE SODIUM 40 MG/1
40 TABLET, DELAYED RELEASE ORAL DAILY
Qty: 90 TABLET | Refills: 1 | Status: SHIPPED | OUTPATIENT
Start: 2022-08-04

## 2022-08-04 RX ORDER — INSULIN GLARGINE 100 [IU]/ML
10 INJECTION, SOLUTION SUBCUTANEOUS
Qty: 3 ML | Refills: 1 | Status: SHIPPED | OUTPATIENT
Start: 2022-08-04 | End: 2022-10-03

## 2022-08-04 NOTE — PROGRESS NOTES
Nisha Escamilla is a 45 y.o. female presenting today for Follow-up  . Chief Complaint   Patient presents with    Follow-up       HPI:  Nisha Escamilla presents to the office today for follow up. Patient has a PMHx of T2DM, HTN, HLD.    T2DM: Patient is on Lantus 10 units nightly along with metformin and Trulicity. Reports numbness/tingling in her bilateral feet along with shooting pain. She has tried gabapentin in the past-reported side effects so she stopped taking it. Last HbA1c was 11.6%>>10.6%  She has not been checking her sugars. Noted to have microalbuminuria: On Lisinopril. She has been stressed recently due to her . Has not been watching her diet and has been eating a lot of carbs. Knee Pain: Patient saw Orthopedics. She was started on topical NSAID along with oral and referred to physical therapy. HTN: Controlled on atenolol and lisinopril. HLD: She was started back on atorvastatin. LDL in 5/22 was 110    GERD: Reports significant improvement in cough and heartburn since she was started on Protonix,    Healthcare maintenance:  Mammogram showed 3 likely 9 foci in the left breast.  Repeat ultrasound recommended in 6 months. Referred to GI for colon cancer screening  Due for Pap smear          Review of Systems   Constitutional:  Negative for chills, diaphoresis, fever, malaise/fatigue and weight loss. HENT:  Negative for congestion, ear discharge, ear pain, hearing loss, nosebleeds, sinus pain, sore throat and tinnitus. Eyes:  Negative for blurred vision, double vision and photophobia. Respiratory:  Positive for shortness of breath. Negative for cough, sputum production, wheezing and stridor. Cardiovascular:  Positive for palpitations. Negative for chest pain, orthopnea, claudication and leg swelling. Gastrointestinal:  Positive for heartburn. Negative for abdominal pain, blood in stool, constipation, diarrhea, melena, nausea and vomiting.    Genitourinary: Positive for frequency. Negative for dysuria, flank pain, hematuria and urgency. Musculoskeletal:  Positive for back pain and joint pain. Negative for myalgias and neck pain. Skin:  Negative for rash. Neurological:  Positive for tingling and sensory change. Negative for tremors, speech change, focal weakness, seizures, weakness and headaches. Psychiatric/Behavioral:  Negative for depression. The patient is not nervous/anxious. All other systems reviewed and are negative. Allergies   Allergen Reactions    Codeine Itching       PHQ Screening   3 most recent PHQ Screens 8/4/2022   Little interest or pleasure in doing things Not at all   Feeling down, depressed, irritable, or hopeless Not at all   Total Score PHQ 2 0   Trouble falling or staying asleep, or sleeping too much -   Feeling tired or having little energy -   Poor appetite, weight loss, or overeating -   Feeling bad about yourself - or that you are a failure or have let yourself or your family down -   Trouble concentrating on things such as school, work, reading, or watching TV -   Moving or speaking so slowly that other people could have noticed; or the opposite being so fidgety that others notice -   Thoughts of being better off dead, or hurting yourself in some way -   PHQ 9 Score -   How difficult have these problems made it for you to do your work, take care of your home and get along with others -       History  Past Medical History:   Diagnosis Date    Arthritis     Diabetes ([facility] [address]        History reviewed. No pertinent surgical history. Social History     Socioeconomic History    Marital status:      Spouse name: Not on file    Number of children: Not on file    Years of education: Not on file    Highest education level: Not on file   Occupational History    Not on file   Tobacco Use    Smoking status: Never    Smokeless tobacco: Never   Vaping Use    Vaping Use: Never used   Substance and Sexual Activity    Alcohol use:  Yes Comment: Rare    Drug use: Yes     Types: Marijuana     Comment: PRN when she can't sleep, edibles    Sexual activity: Never   Other Topics Concern    Not on file   Social History Narrative    Not on file     Social Determinants of Health     Financial Resource Strain: Not on file   Food Insecurity: Not on file   Transportation Needs: Not on file   Physical Activity: Unknown    Days of Exercise per Week: 0 days    Minutes of Exercise per Session: Not on file   Stress: Not on file   Social Connections: Not on file   Intimate Partner Violence: Not At Risk    Fear of Current or Ex-Partner: No    Emotionally Abused: No    Physically Abused: No    Sexually Abused: No   Housing Stability: Not on file       Current Outpatient Medications   Medication Sig Dispense Refill    albuterol (PROVENTIL HFA, VENTOLIN HFA, PROAIR HFA) 90 mcg/actuation inhaler Take 1 Puff by inhalation every four (4) hours as needed for Wheezing or Shortness of Breath. Indications: bronchospasm 18 g 2    pantoprazole (PROTONIX) 40 mg tablet Take 1 Tablet by mouth in the morning. 90 Tablet 1    dulaglutide (Trulicity) 4.5 WF/4.9 mL pnij 4.5 mg by SubCUTAneous route every seven (7) days. 4 Each 3    Insulin Needles, Disposable, 31 gauge x 5/16\" ndle Use with insulin lantus nightly 50 Each 5    insulin glargine (LANTUS,BASAGLAR) 100 unit/mL (3 mL) inpn 10 Units by SubCUTAneous route nightly for 60 days. 3 mL 1    lisinopriL (PRINIVIL, ZESTRIL) 5 mg tablet TAKE 1 TABLET BY MOUTH EVERY DAY 90 Tablet 1    atorvastatin (LIPITOR) 20 mg tablet TAKE 1 TABLET BY MOUTH EVERY DAY 90 Tablet 1    atenoloL (TENORMIN) 50 mg tablet TAKE 1 TABLET BY MOUTH EVERY DAY 90 Tablet 1    OneTouch Verio test strips strip       OneTouch Delica Plus Lancet 33 gauge misc       diclofenac EC (VOLTAREN) 50 mg EC tablet Take 1 Tablet by mouth two (2) times daily (with meals).  60 Tablet 1    diclofenac (VOLTAREN) 1 % gel Apply 2 g to affected area every six (6) hours as needed for Pain (right middle finger). 100 g 1    metFORMIN ER (GLUCOPHAGE XR) 500 mg tablet Take 1,000 mg by mouth two (2) times a day. Vitals:    08/04/22 1043   BP: 132/78   Pulse: 79   Resp: 16   Temp: 98.3 °F (36.8 °C)   TempSrc: Temporal   SpO2: 98%   Weight: 286 lb (129.7 kg)   Height: 5' 6\" (1.676 m)   PainSc:   5       Physical Exam  Vitals and nursing note reviewed. Constitutional:       General: She is not in acute distress. Appearance: Normal appearance. She is obese. She is not ill-appearing, toxic-appearing or diaphoretic. HENT:      Head: Normocephalic and atraumatic. Eyes:      General: No scleral icterus. Extraocular Movements: Extraocular movements intact. Conjunctiva/sclera: Conjunctivae normal.      Pupils: Pupils are equal, round, and reactive to light. Cardiovascular:      Rate and Rhythm: Normal rate and regular rhythm. Pulses: Normal pulses. Heart sounds: No murmur heard. Pulmonary:      Effort: Pulmonary effort is normal. No respiratory distress. Breath sounds: Normal breath sounds. No wheezing or rales. Abdominal:      General: Bowel sounds are normal. There is no distension. Palpations: Abdomen is soft. Tenderness: There is no abdominal tenderness. There is no guarding. Musculoskeletal:      Cervical back: Normal range of motion. Right lower leg: Edema present. Left lower leg: Edema present. Comments: Limited ROM due to back and knee pain  Trace LE edema   Skin:     General: Skin is warm and dry. Coloration: Skin is not jaundiced or pale. Neurological:      General: No focal deficit present. Mental Status: She is alert and oriented to person, place, and time. Mental status is at baseline. Cranial Nerves: No cranial nerve deficit. Motor: No weakness. Gait: Gait normal.   Psychiatric:         Mood and Affect: Mood normal.         Behavior: Behavior normal.         Thought Content:  Thought content normal. Judgment: Judgment normal.     Diabetic foot exam: (5/3/22)    Left Foot:   Visual Exam: normal    Pulse DP: 2+ (normal)   Filament test: reduced sensation          Right Foot:   Visual Exam: normal    Pulse DP: 2+ (normal)   Filament test: reduced sensation            Office Visit on 06/28/2022   Component Date Value Ref Range Status    Hemoglobin A1c (POC) 06/28/2022 10.2  % Final   Hospital Outpatient Visit on 05/31/2022   Component Date Value Ref Range Status    LIPID PROFILE 05/31/2022        Final    Cholesterol, total 05/31/2022 175  <200 MG/DL Final    Triglyceride 05/31/2022 96  <150 MG/DL Final    Comment: The drugs N-acetylcysteine (NAC) and  Metamiszole have been found to cause falsely  low results in this chemical assay. Please  be sure to submit blood samples obtained  BEFORE administration of either of these  drugs to assure correct results. HDL Cholesterol 05/31/2022 45  40 - 60 MG/DL Final    LDL, calculated 05/31/2022 110.8 (A) 0 - 100 MG/DL Final    VLDL, calculated 05/31/2022 19.2  MG/DL Final    CHOL/HDL Ratio 05/31/2022 3.9  0 - 5.0   Final    TSH 05/31/2022 0.76  0.36 - 3.74 uIU/mL Final    Microalbumin,urine random 05/31/2022 6.56 (A) 0 - 3.0 MG/DL Final    Creatinine, urine 05/31/2022 154.00 (A) 30 - 125 mg/dL Final    Microalbumin/Creat ratio (mg/g cre* 05/31/2022 43 (A) 0 - 30 mg/g Final    Hepatitis C virus Ab 05/31/2022 0.06  <0.80 Index Final    Hep C virus Ab Interp. 05/31/2022 Negative  NEG   Final    Hep C  virus Ab comment 05/31/2022        Final    Comment: Index <0.80. ......................... Shira Money Negative  Index > or = to 0.80 and <1.00. .... Shira Money Shira Money Equivocal  Index >1.00. ......................... Shira Money Positive          For Equivocal or Positive results, confirmation with Hepatitis C RNA by PCR or bDNA is suggested.      Office Visit on 05/31/2022   Component Date Value Ref Range Status    Hemoglobin A1c (POC) 05/31/2022 11.6  % Final       No results found for any visits on 08/04/22. Patient Care Team:  Patient Care Team:  Marco Antonio Goodwin MD as PCP - General (Internal Medicine Physician)  Marco Antonio Goodwin MD as PCP - [facility] Provider      Assessment / Plan:      ICD-10-CM ICD-9-CM    1. Type 2 diabetes mellitus with hyperglycemia, unspecified whether long term insulin use (HCC)  E11.65 250.00 dulaglutide (Trulicity) 4.5 XP/9.9 mL pnij      Insulin Needles, Disposable, 31 gauge x 5/16\" ndle      insulin glargine (LANTUS,BASAGLAR) 100 unit/mL (3 mL) inpn      2. Shortness of breath  R06.02 786.05 albuterol (PROVENTIL HFA, VENTOLIN HFA, PROAIR HFA) 90 mcg/actuation inhaler      3. Gastroesophageal reflux disease without esophagitis  K21.9 530.81 pantoprazole (PROTONIX) 40 mg tablet      4. Breast nodule  N63.0 793.89 US BREAST LT LIMITED=<3 QUAD      5. Essential hypertension  I10 401.9       6. Microalbuminuria  R80.9 791.0             T2DM: Improving. HbA1c 13 >>11.6 >> 10.2%. Continue metformin, Lantus 10 units nightly. Increase dosage of Trulicity to 4.5 mg  Foot exam with reduced sensation bilaterally. Does not want to take gabapentin or Lyrica. Microalbuminuria -continue lisinopril 5 mg daily. Reviewed eye exam notes. Counseled on diet and decreasing carbohydrates. HLD: Continue statin    HTN: BP acceptable. Continue atenolol and lisinopril. Bilateral knee pain: Continue diclofenac. Starting PT soon. Obesity: Counseled on diet and exercise. GERD: Significantly improved with Protonix. Continue. Repeat ultrasound breast in 6 months. Referred to GI for colon cancer screening. Due for pap smear:  Referred to OBGYN. Refused pneumonia vaccine. Has received COVID-19 vaccine X3 doses. Refused pneumonia vaccine. Follow-up and Dispositions    Return in about 8 weeks (around 9/29/2022). I asked the patient if she  had any questions and answered her  questions.   The patient stated that she understands the treatment plan and agrees with the treatment plan    This document was created with a voice activated dictation system and may contain transcription errors.

## 2022-08-08 ENCOUNTER — HOSPITAL ENCOUNTER (OUTPATIENT)
Dept: PHYSICAL THERAPY | Age: 52
Discharge: HOME | End: 2022-08-08
Payer: COMMERCIAL

## 2022-08-08 PROCEDURE — 97162 PT EVAL MOD COMPLEX 30 MIN: CPT

## 2022-08-08 PROCEDURE — 97110 THERAPEUTIC EXERCISES: CPT

## 2022-08-08 PROCEDURE — 97530 THERAPEUTIC ACTIVITIES: CPT

## 2022-08-08 NOTE — PROGRESS NOTES
PT DAILY TREATMENT NOTE/KNEE EVAL     Patient Name: Roma Gr  Date:2022  : 1970  [x]  Patient  Verified  Payor: Darene Lundborg / Plan: Maco Arora PPO / Product Type: PPO /    In time:9:47A  Out time:10:28A  Total Treatment Time (min): 41  Visit #: 1 of 12      Treatment Area: Right knee pain [M25.561]  Left knee pain [M25.562]    SUBJECTIVE  Pain Level (0-10 scale): 5/10 (at best: 5/10, at worst: 10/10)  []constant []intermittent []improving []worsening []no change since onset    Any medication changes, allergies to medications, adverse drug reactions, diagnosis change, or new procedure performed?: [x] No    [] Yes (see summary sheet for update)  Subjective functional status/changes:     Patient is a 58-year-old female who presents to therapy B knee pain of insidious onset beginning 1 year ago. Symptoms localized to anterior knee/inferior patellas. Symptoms described as an ache. Symptoms aggravated with ambulating longer distances and standing, sitting for prolonged periods, and crossing right LE over left on right; symptoms reduced with rest, heat, creams, and medications for brief periods. She reports edema to B knees and that her knees will give out occasionally. Patient presents to therapy ambulating with antalgic gait with decreased B knee flex and lack of TKE B. Patient stands with increased right tibial ER, wide MAXIMILIANO, and increased genu varus B. Exam reveals patient with decreased hip strength most noted with flex (3+/5 B). She lacks full knee ext AROM B and demonstrates min defict in left knee flex AROM vs right. Min decreased hamstring flexibility B. Patient with TTP to B medial joint lines and pes ansirene along with left lateral joint line. Patellar hypomobility noted B (more limited on left). Special testing for knee negative B however pain reported to lateral knee with valgus at 30 deg on right.  Patient able to maintain SLS for 3 sec on right and 13 sec on left; right LE hooked on left with left SLS and pain B with testing. Patient would benefit from skilled outpatient PT to address above mentioned deficits to return to prior level of function, increase independence with ADLs, and improve overall quality of life. Patient recently moved to area from [place].     23 min [x]Eval                  []Re-Eval       8 min Therapeutic Exercise:  [] See flow sheet : HEP review   Rationale: increase ROM, increase strength, improve balance, and increase proprioception to improve the patients ability to perform ADLs with increased ease    10 min Therapeutic Activity:  []  See flow sheet : patient education   Rationale: increase ROM, increase strength, improve balance, and increase proprioception  to improve the patients ability to perform ADLs with increased ease             With   [] TE   [x] TA   [] neuro   [] other: Patient Education: [x] Review HEP    [] Progressed/Changed HEP based on:   [] positioning   [] body mechanics   [] transfers   [] heat/ice application    [x] other: diagnosis, prognosis, POC, purpose and importance of therapy; anatomy and physiology as it relates to current condition;ability to trial bike at home and regress to arcs if needed      Other Objective/Functional Measures:     Physical Therapy Evaluation - Knee    Posture: [] Varus    [] Valgus    [] Recurvatum        [] Tibial Torsion    [] Foot Supination    [] Foot Pronation    Describe: increased right tibial IR, wide MAXIMILIANO    Gait:  [] Normal    [] Abnormal    [] Antalgic    [] NWB    Device:    Describe:    ROM / Strength  [] Unable to assess                  AROM                      PROM                   Strength (1-5)    Left Right Left Right Left Right   Hip Flexion 109 114   3+ 3+    Extension -2 -3        Abduction          Adduction         Knee Flexion     4+ 4    Extension     4 4+   Ankle Plantarflexion          Dorsiflexion     5 5   Right hip ER 4- IR 4-  Left hip ER 4 IR 4    Flexibility: [] Unable to assess at this time  Hamstrings:    (L) Tightness= [] WNL   [x] Min   [] Mod   [] Severe    (R) Tightness= [] WNL   [x] Min   [] Mod   [] Severe  Quadriceps:    (L) Tightness= [] WNL   [] Min   [] Mod   [] Severe    (R) Tightness= [] WNL   [] Min   [] Mod   [] Severe  Gastroc:      (L) Tightness= [x] WNL   [] Min   [] Mod   [] Severe    (R) Tightness= [x] WNL   [] Min   [] Mod   [] Severe  Other:    Palpation:   Neg/Pos  Neg/Pos  Neg/Pos   Joint Line Pos medial Quad tendon  Patellar ligament    Patella  Fibular head  Pes Anserinus pos   Tibial tubercle  Hamstring tendons  Infrapatellar fat pad        Right   Neg/Pos  Neg/Pos  Neg/Pos   Joint Line Pos medial and lateral Quad tendon neg Patellar ligament neg   Patella neg Fibular head  Pes Anserinus pos   Tibial tubercle neg Hamstring tendons neg Infrapatellar fat pad neg     Optional Tests:  Patellar Positioning (Static)   []L []R Normal []L []R Lateral   []L []R Shakila Birken[place]      []L []R Medial   []L []R [place]    Patellar Tracking   []L []R Glide (Lat)   []L []R Tilt (Lat)     []L []R Glide (Med)  []L []R Tilt (Med)      []L []R Tile (Inf)     Patellar Mobility   []L []R Hypermobile [x]L [x]R Hypomobile      Riht most noted with inf glide     Left-all but medial glide    Girth Measurements:     Cm at joint line  Cm above joint line   Cm at   Cm below joint line  Cm at joint line   Left 47       Right  46         Lachmans  [x] Neg    [] Pos Posterior Drawer [x] Neg    [] Pos  Pivot Shift  [] Neg    [] Pos Posterior Sag  [] Neg    [] Pos  BEST   [] Neg    [] Pos Sridhar's Test [] Neg    [] Pos  ALRI   [] Neg    [] Pos Squat   [] Neg    [] Pos  Valgus@ 0 Degrees [x] Neg    [] Pos Ct [x] Neg    [] Pos  Valgus@ 30 Degrees [x] Neg    [] Pos Patellar Apprehension [] Neg    [] Pos  Varus@ 0 Degrees [x] Neg    [] Pos Meredith's Compression [] Neg    [] Pos  Varus@ 30 Degrees [x] Neg    [] Pos Ely's Test  [] Neg    [] Pos  Apley's Compression [] Neg    [] Pos Abdiel's Test  [] Neg [] Pos  Apley's Distraction [] Neg    [] Pos Stroke Test  [] Neg    [] Pos   Anterior Drawer [] Neg    [] Pos Fluctuation Test [] Neg    [] Pos  Other:                  [] Neg    [] Pos                 Other tests/comments:    Tandem: 30 sec B  SLS: right 3 sec, left 13 sec (with left LE hooked on right)  Pain B (worse on right)  Right knee: pain to anterior knee with KUN and lateral hip with FADIR  Pain to lateral joint line with valgus at 30 deg    Pain to right knee with ER to cross right leg over right       Pain Level (0-10 scale) post treatment: 5/10    ASSESSMENT/Changes in Function: See POC    Patient will continue to benefit from skilled PT services to modify and progress therapeutic interventions, address functional mobility deficits, address ROM deficits, address strength deficits, analyze and address soft tissue restrictions, analyze and cue movement patterns, analyze and modify body mechanics/ergonomics, assess and modify postural abnormalities, address imbalance/dizziness, and instruct in home and community integration to attain remaining goals.      [x]  See Plan of Care  []  See progress note/recertification  []  See Discharge Summary         Progress towards goals / Updated goals:  See POC    PLAN  [x]  Upgrade activities as tolerated     [x]  Continue plan of care  []  Update interventions per flow sheet       []  Discharge due to:_  []  Other:_      Clemencia Dorantes, PT 8/8/2022  8:38 AM

## 2022-08-08 NOTE — PROGRESS NOTES
[facility] [employer] [facility]  [address] [place] [address]  [phone] fax    Plan of Care/ Statement of Necessity for Physical Therapy Services    Patient name: Akira Vargas Start of Care: 2022   Referral source: Radha Lunsford DO : 1970    Medical Diagnosis: Right knee pain [M25.561]  Left knee pain [M25.562]  Payor: Audie Mack / Plan: [employer] PPO / Product Type: PPO /  Onset Date:ongoing for 1 year    Treatment Diagnosis: Bilateral knee pain   Prior Hospitalization: see medical history Provider#: [id number]   Medications: Verified on Patient summary List    Comorbidities: arthritis, diabetes, history of back pain   Prior Level of Function: lives in 1 story home with ramp; enjoys walking for recreation; helps care for  (frequent falls)    The Plan of Care and following information is based on the information from the initial evaluation. Assessment/ key information: Patient is a 68-year-old female who presents to therapy B knee pain of insidious onset beginning 1 year ago. Symptoms localized to anterior knee/inferior patellas. Symptoms described as an ache. Symptoms aggravated with ambulating longer distances and standing, sitting for prolonged periods, and crossing right LE over left on right; symptoms reduced with rest, heat, creams, and medications for brief periods. She reports edema to B knees and that her knees will give out occasionally. Patient presents to therapy ambulating with antalgic gait with decreased B knee flex and lack of TKE B. Patient stands with increased right tibial ER, wide MAXIMILIANO, and increased genu varus B. Exam reveals patient with decreased hip strength most noted with flex (3+/5 B). She lacks full knee ext AROM B and demonstrates min defict in left knee flex AROM vs right. Min decreased hamstring flexibility B. Patient with TTP to B medial joint lines and pes ansirene along with left lateral joint line.  Patellar hypomobility noted B (more limited on left). Special testing for knee negative B however pain reported to lateral knee with valgus at 30 deg on right. Patient able to maintain SLS for 3 sec on right and 13 sec on left; right LE hooked on left with left SLS and pain B with testing. Patient would benefit from skilled outpatient PT to address above mentioned deficits to return to prior level of function, increase independence with ADLs, and improve overall quality of life. Evaluation Complexity History HIGH Complexity :3+ comorbidities / personal factors will impact the outcome/ POC ; Examination HIGH Complexity : 4+ Standardized tests and measures addressing body structure, function, activity limitation and / or participation in recreation  ;Presentation MEDIUM Complexity : Evolving with changing characteristics  ; Clinical Decision Making MEDIUM Complexity : FOTO score of 26-74  Overall Complexity Rating: MEDIUM  Problem List: pain affecting function, decrease ROM, decrease strength, edema affecting function, impaired gait/ balance, decrease ADL/ functional abilitiies, decrease activity tolerance, decrease flexibility/ joint mobility, and decrease transfer abilities   Treatment Plan may include any combination of the following: Therapeutic exercise, Therapeutic activities, Neuromuscular re-education, Physical agent/modality, Gait/balance training, Manual therapy, Patient education, Self Care training, Functional mobility training, Home safety training, and Stair training  Patient / Family readiness to learn indicated by: asking questions, trying to perform skills, and interest  Persons(s) to be included in education: patient (P)  Barriers to Learning/Limitations: None  Patient Goal (s): to not have pain anymore  Patient Self Reported Health Status: good  Rehabilitation Potential: fair    Short Term Goals: To be accomplished in 1 weeks:  1.  Patient will report compliance with initial HEP to optimize therapy outcomes. Status at eval: established  Long Term Goals: To be accomplished in 6 weeks:  1. Patient will improve FOTO score to at least 46/100 points in order to demonstrate functional improvement. Status at eval: 44/100               2. Patient will report no more than \"limited a little\" with \"walking more than a mile\" with FOTO in order to demonstrate improved tolerance to recreational tasks. Status at eval: \"limited a lot\"   3. Patient will report no greater than 6/10 pain to B knees in order to perform ADLs with increased ease. Status at eval: 10/10   4. Patient will improve B hip flex strength to at least 4/5 with MMT in order to perform functional tasks with increased ease. Status at eval: 3+/5 B   5. Patient will be able to maintain SLS for at least 10 sec B in order to perform daily tasks with increased stability. Status at eval: 3 sec right, 13 sec left with compensation    Frequency / Duration: Patient to be seen 2 times per week for 6 weeks. Patient/  Caregiver education and instruction: Diagnosis, prognosis, self care, activity modification, and exercises   [x]  Plan of care has been reviewed with PTA    Iris Luong, PT 8/8/2022 8:38 AM    ________________________________________________________________________    I certify that the above Therapy Services are being furnished while the patient is under my care. I agree with the treatment plan and certify that this therapy is necessary.     [de-identified] Signature:____________Date:_________TIME:________     Radha Lunsford DO  ** Signature, Date and Time must be completed for valid certification **    Please sign and return to [facility] [employer] [facility]  [address] [place] [address]  [phone] fax

## 2022-08-11 ENCOUNTER — HOSPITAL ENCOUNTER (OUTPATIENT)
Dept: PHYSICAL THERAPY | Age: 52
Discharge: HOME | End: 2022-08-11
Payer: COMMERCIAL

## 2022-08-11 PROCEDURE — 97530 THERAPEUTIC ACTIVITIES: CPT

## 2022-08-11 PROCEDURE — 97110 THERAPEUTIC EXERCISES: CPT

## 2022-08-11 PROCEDURE — 97112 NEUROMUSCULAR REEDUCATION: CPT

## 2022-08-11 NOTE — PROGRESS NOTES
PT DAILY TREATMENT NOTE     Patient Name: Lizet Latif  Date:2022  : 1970  [x]  Patient  Verified  Payor: Dexter Auguste / Plan: Akash Zelaya PPO / Product Type: PPO /    In time:2:22  Out time:2:55  Total Treatment Time (min): 33  Visit #: 2 of 12    Treatment Area: Right knee pain [M25.561]  Left knee pain [M25.562]    SUBJECTIVE  Pain Level (0-10 scale): 5  Any medication changes, allergies to medications, adverse drug reactions, diagnosis change, or new procedure performed?: [x] No    [] Yes (see summary sheet for update)  Subjective functional status/changes:   [] No changes reported  Pt states she has been doing the home exercises    OBJECTIVE    13 min Therapeutic Exercise:  [x] See flow sheet :   Rationale: increase ROM and increase strength to improve the patients ability to perform ADLs    10 min Therapeutic Activity:  [x]  See flow sheet :   Rationale: increase strength, improve coordination, improve balance, and increase proprioception  to improve the patients ability to increase ease with daily tasks     10 min Neuromuscular Re-education:  [x]  See flow sheet :   Rationale: increase strength, improve coordination, improve balance, and increase proprioception  to improve the patients ability to perform functional tasks            With   [] TE   [] TA   [] neuro   [] other: Patient Education: [x] Review HEP    [] Progressed/Changed HEP based on:   [] positioning   [] body mechanics   [] transfers   [] heat/ice application    [] other:      Other Objective/Functional Measures:   First visit after Eval     Pain Level (0-10 scale) post treatment: 5    ASSESSMENT/Changes in Function: Initiated treatment program per flow sheet. Reviewed HEP for understanding and compliance. Pt fatigues easily with therex but is compliant and motivated. Reports right > left knee pain with therex as well as right hip pain. PD modalities and states she will do it at home if needed.     Patient will continue to benefit from skilled PT services to modify and progress therapeutic interventions, address functional mobility deficits, address ROM deficits, address strength deficits, analyze and address soft tissue restrictions, analyze and cue movement patterns, analyze and modify body mechanics/ergonomics, and assess and modify postural abnormalities to attain remaining goals. []  See Plan of Care  []  See progress note/recertification  []  See Discharge Summary         Progress towards goals / Updated goals:  Short Term Goals: To be accomplished in 1 weeks:  1. Patient will report compliance with initial HEP to optimize therapy outcomes. Status at eval: established   Current: Pt reports compliance 8/11/2022  Long Term Goals: To be accomplished in 6 weeks:  1. Patient will improve FOTO score to at least 46/100 points in order to demonstrate functional improvement. Status at eval: 44/100               2. Patient will report no more than \"limited a little\" with \"walking more than a mile\" with FOTO in order to demonstrate improved tolerance to recreational tasks. Status at eval: \"limited a lot\"               3. Patient will report no greater than 6/10 pain to B knees in order to perform ADLs with increased ease. Status at eval: 10/10               4. Patient will improve B hip flex strength to at least 4/5 with MMT in order to perform functional tasks with increased ease. Status at eval: 3+/5 B               5. Patient will be able to maintain SLS for at least 10 sec B in order to perform daily tasks with increased stability.                             Status at eval: 3 sec right, 13 sec left with compensation    PLAN  []  Upgrade activities as tolerated     [x]  Continue plan of care  []  Update interventions per flow sheet       []  Discharge due to:_  []  Other:_      Earlyne Mention Dorina, PTA 8/11/2022  2:23 PM    Future Appointments   Date Time Provider Rony Salgado   8/16/2022 10:30 AM Rosita Manuel Money, [place] [facility]   8/18/2022 10:30 AM Rosita Manuel Money, [place] [facility]   8/23/2022 10:30 AM Lovena Dates, PT [facility]   8/25/2022 10:30 AM Ethelene Ink, PT [facility]   8/30/2022  8:45 AM Phyllistine Party, DO [facility]   8/30/2022 11:15 AM Rosita Manuel Money, PT [facility]   9/1/2022 10:30 AM Ethelene Ink, PT [facility]   9/6/2022  9:45 AM Ethelene Ink, PT [facility]   9/8/2022 10:30 AM Rosita Manuel Money, PT [facility]   9/13/2022 11:15 AM Rosita Manuel Money, PT [facility]   9/15/2022 10:30 AM Rosita Manuel Money, PT [facility]   9/20/2022 10:30 AM Lovena Dates, PT [facility]   10/6/2022  9:00 AM Mo Buchanan MD [facility] AM[facility]

## 2022-08-16 ENCOUNTER — HOSPITAL ENCOUNTER (OUTPATIENT)
Dept: PHYSICAL THERAPY | Age: 52
Discharge: HOME | End: 2022-08-16
Payer: COMMERCIAL

## 2022-08-16 PROCEDURE — 97110 THERAPEUTIC EXERCISES: CPT

## 2022-08-16 NOTE — PROGRESS NOTES
PT DAILY TREATMENT NOTE     Patient Name: Harper Weston  Date:2022  : 1970  [x]  Patient  Verified  Payor: Eduar Doran / Plan: Cloretta Reges PPO / Product Type: PPO /    In time:11:20A  Out time:12:04P  Total Treatment Time (min): 44  Visit #: 3 of 12    Treatment Area: Right knee pain [M25.561]  Left knee pain [M25.562]    SUBJECTIVE  Pain Level (0-10 scale): 4/10  Any medication changes, allergies to medications, adverse drug reactions, diagnosis change, or new procedure performed?: [x] No    [] Yes (see summary sheet for update)  Subjective functional status/changes:   [] No changes reported  Patient reports she was sore after last visit in her hips. It felt like a muscle soreness from working out more. She has been doing the exercises she could remember; some of the are the same ones her  does. OBJECTIVE      44 min Therapeutic Exercise:  [x] See flow sheet :   Rationale: increase ROM, increase strength, improve coordination, improve balance, and increase proprioception to improve the patients ability to perform daily tasks with increased ease               With   [] TE   [] TA   [] neuro   [] other: Patient Education: [x] Review HEP    [] Progressed/Changed HEP based on:   [] positioning   [] body mechanics   [] transfers   [] heat/ice application    [] other: 3 systems for balance input     Other Objective/Functional Measures:    Stretching at end to reduce DOMS  Issued HEP  Increased genu varus with SLS  Limited depth with mini squats due to right knee pain  Good overall form with mini squats  Increased sway and intermittent tapping on parallel bars for balance with Romberg on foam with vision removed  Glute med/min pain reported with attempt at [facility] stretch    Pain Level (0-10 scale) post treatment: 5/10 knees    ASSESSMENT/Changes in Function: Progressed activities per flow sheet.  She continues to demonstrate malpositioning of B knees in standing and ambulate with decreased knee flex AROM. Patient with good overall tolerance to session with min increased knee pain and overall soreness reported at end of session. Patient will continue to benefit from skilled PT services to modify and progress therapeutic interventions, address functional mobility deficits, address ROM deficits, address strength deficits, analyze and address soft tissue restrictions, analyze and cue movement patterns, analyze and modify body mechanics/ergonomics, assess and modify postural abnormalities, address imbalance/dizziness, and instruct in home and community integration to attain remaining goals. Progress towards goals / Updated goals:  Short Term Goals: To be accomplished in 1 weeks:  1. Patient will report compliance with initial HEP to optimize therapy outcomes. Status at eval: established              Current: Pt reports compliance 8/11/2022   Progressing-patient reports compliance, formally issued HEP this visit. (8/16/22)  Long Term Goals: To be accomplished in 6 weeks:  1. Patient will improve FOTO score to at least 46/100 points in order to demonstrate functional improvement. Status at eval: 44/100               2. Patient will report no more than \"limited a little\" with \"walking more than a mile\" with FOTO in order to demonstrate improved tolerance to recreational tasks. Status at eval: \"limited a lot\"               3. Patient will report no greater than 6/10 pain to B knees in order to perform ADLs with increased ease. Status at eval: 10/10               4. Patient will improve B hip flex strength to at least 4/5 with MMT in order to perform functional tasks with increased ease. Status at eval: 3+/5 B               5. Patient will be able to maintain SLS for at least 10 sec B in order to perform daily tasks with increased stability.                             Status at eval: 3 sec right, 13 sec left with compensation    PLAN  [x]  Upgrade activities as tolerated     [x]  Continue plan of care  []  Update interventions per flow sheet       []  Discharge due to:_  []  Other:_      Francois Gonzalez, PT 8/16/2022  10:10 AM    Future Appointments   Date Time Provider Rony Naomi   8/16/2022 11:15 AM Rosita Olguinr, PT [facility]   8/18/2022 10:30 AM Rosita Olguinr, PT [facility]   8/23/2022 10:30 AM Vijaya Rakes, PT [facility]   8/25/2022 10:30 AM Claryce Chester, PT [facility]   8/30/2022  8:45 AM DO RON Mak BS AMB   8/30/2022 11:15 AM Rosita Shirley Boer, PT [facility]   9/1/2022 10:30 AM Claryce Chester, PT [facility]   9/6/2022  9:45 AM Claryce Chester, PT [facility]   9/8/2022 10:30 AM Rosita Olguinr, PT [facility]   9/13/2022 11:15 AM Rosita Shirley Boer, PT [facility]   9/15/2022 10:30 AM Rosita Olguinr, PT [facility]   9/20/2022 10:30 AM Vijaya Rakris, PT [facility]   10/6/2022  9:00 AM Modesta Buchanan MD [facility]

## 2022-08-18 ENCOUNTER — HOSPITAL ENCOUNTER (OUTPATIENT)
Dept: PHYSICAL THERAPY | Age: 52
Discharge: HOME | End: 2022-08-18
Payer: COMMERCIAL

## 2022-08-18 PROCEDURE — 97112 NEUROMUSCULAR REEDUCATION: CPT

## 2022-08-18 PROCEDURE — 97110 THERAPEUTIC EXERCISES: CPT

## 2022-08-18 NOTE — PROGRESS NOTES
PT DAILY TREATMENT NOTE     Patient Name: Di April  Date:2022  : 1970  [x]  Patient  Verified  Payor: Deann Corey / Plan: Michele Marcus PPO / Product Type: PPO /    In time: 10:37  Out time: 11:21  Total Treatment Time (min): 44  Visit #: 4 of 12      Treatment Area: Right knee pain [M25.561]  Left knee pain [M25.562]    SUBJECTIVE  Pain Level (0-10 scale): 6/10  Any medication changes, allergies to medications, adverse drug reactions, diagnosis change, or new procedure performed?: [x] No    [] Yes (see summary sheet for update)  Subjective functional status/changes:   [] No changes reported  Pt reports doing good so far today.      OBJECTIVE    Modality rationale: decrease edema, decrease inflammation, and decrease pain to improve the patients ability to tolerate ADLs/amb   Min Type Additional Details    [] Estim:  []Unatt       []IFC  []Premod                        []Other:  []w/ice   []w/heat  Position:  Location:    [] Estim: []Att    []TENS instruct  []NMES                    []Other:  []w/US   []w/ice   []w/heat  Position:  Location:    []  Traction: [] Cervical       []Lumbar                       [] Prone          []Supine                       []Intermittent   []Continuous Lbs:  [] before manual  [] after manual    []  Ultrasound: []Continuous   [] Pulsed                           []1MHz   []3MHz W/cm2:  Location:    []  Iontophoresis with dexamethasone         Location: [] Take home patch   [] In clinic   5 [x]  Ice     []  heat  []  Ice massage  []  Laser   []  Anodyne Position: supine, wedge for B Les  Location: B knees    []  Laser with stim  []  Other:  Position:  Location:    []  Vasopneumatic Device    []  Right     []  Left  Pre-treatment girth:  Post-treatment girth:  Measured at (location):  Pressure:       [] lo [] med [] hi   Temperature: [] lo [] med [] hi   [x] Skin assessment post-treatment:  [x]intact []redness- no adverse reaction    []redness - adverse reaction: 31 min Therapeutic Exercise:  [x] See flow sheet :   Rationale: increase ROM and increase strength to improve the patients ability to perform ADLs/amb with more ease      8 min Neuromuscular Re-education:  [x]  See flow sheet :   Rationale: improve coordination, improve balance, and increase proprioception  to improve the patients ability to perform ADLs amb with more ease      With   [] TE   [] TA   [] neuro   [] other: Patient Education: [x] Review HEP    [] Progressed/Changed HEP based on:   [] positioning   [] body mechanics   [] transfers   [] heat/ice application    [] other:      Other Objective/Functional Measures:    Min challenged with hip IR    Significant stretching with inclined calves stretch   Min difficulty with mini squat       Pain Level (0-10 scale) post treatment: 0/10    ASSESSMENT/Changes in Function: Pt demonstrates good form & tolerance with increased reps for all therex. Cont to challenge with controlling recurvatum in standing. Will cont to progress therex as tolerated. Patient will continue to benefit from skilled PT services to modify and progress therapeutic interventions, address functional mobility deficits, address ROM deficits, address strength deficits, analyze and address soft tissue restrictions, analyze and cue movement patterns, analyze and modify body mechanics/ergonomics, assess and modify postural abnormalities, address imbalance/dizziness, and instruct in home and community integration to attain remaining goals. [x]  See Plan of Care  []  See progress note/recertification  []  See Discharge Summary         Progress towards goals / Updated goals:  Short Term Goals: To be accomplished in 1 weeks:  1. Patient will report compliance with initial HEP to optimize therapy outcomes. Status at al: established              Current: Pt reports compliance 8/11/2022              Progressing-patient reports compliance, formally issued HEP this visit. (8/16/22)  Long Term Goals: To be accomplished in 6 weeks:  1. Patient will improve FOTO score to at least 46/100 points in order to demonstrate functional improvement. Status at eval: 44/100               2. Patient will report no more than \"limited a little\" with \"walking more than a mile\" with FOTO in order to demonstrate improved tolerance to recreational tasks. Status at eval: \"limited a lot\"               3. Patient will report no greater than 6/10 pain to B knees in order to perform ADLs with increased ease. Status at eval: 10/10               4. Patient will improve B hip flex strength to at least 4/5 with MMT in order to perform functional tasks with increased ease. Status at eval: 3+/5 B               5. Patient will be able to maintain SLS for at least 10 sec B in order to perform daily tasks with increased stability.                             Status at eval: 3 sec right, 13 sec left with compensation    PLAN  [x]  Upgrade activities as tolerated     [x]  Continue plan of care  []  Update interventions per flow sheet       []  Discharge due to:_  []  Other:_      Ofelia Witt 8/18/2022  10:39 AM    Future Appointments   Date Time Provider Rony Salgado   8/23/2022 10:30 AM Ping Salomon, PT [facility]   8/25/2022 10:30 AM Media Barbara, PT [facility]   8/30/2022  8:45 AM DO RON Caballero BS AMB   8/30/2022 11:15 AM Rosita Wava Hawkins, PT [facility]   9/1/2022 10:30 AM Media Barbara, PT [facility]   9/6/2022  9:45 AM Media Barbara, PT [facility]   9/8/2022 10:30 AM Rosita Wava Hawkins, PT [facility]   9/13/2022 11:15 AM Rosita Wava Hawkins, PT [facility]   9/15/2022 10:30 AM Rosita Hawkins, PT [facility]   9/20/2022 10:30 AM Ping Salomon, PT [facility]   10/6/2022  9:00 AM Ida Buchanan MD [facility] AM[facility]

## 2022-08-23 ENCOUNTER — TELEPHONE (OUTPATIENT)
Dept: PHYSICAL THERAPY | Age: 52
End: 2022-08-23

## 2022-08-25 ENCOUNTER — HOSPITAL ENCOUNTER (OUTPATIENT)
Dept: PHYSICAL THERAPY | Age: 52
Discharge: HOME | End: 2022-08-25
Payer: COMMERCIAL

## 2022-08-25 PROCEDURE — 97110 THERAPEUTIC EXERCISES: CPT

## 2022-08-25 PROCEDURE — 97140 MANUAL THERAPY 1/> REGIONS: CPT

## 2022-08-25 NOTE — PROGRESS NOTES
PT DAILY TREATMENT NOTE     Patient Name: Nawaf Gao  Date:2022  : 1970  [x]  Patient  Verified  Payor: Shashank May / Plan: Samira Toure PPO / Product Type: PPO /    In time: 10:41 Out time: 11:35  Total Treatment Time (min): 54  Visit #: 5 of 12      Treatment Area: Right knee pain [M25.561]  Left knee pain [M25.562]    SUBJECTIVE  Pain Level (0-10 scale): 6/10  Any medication changes, allergies to medications, adverse drug reactions, diagnosis change, or new procedure performed?: [x] No    [] Yes (see summary sheet for update)  Subjective functional status/changes:   [] No changes reported  Pt reports falling on her ramp causing her to miss last session due to pain. She has had increased sciatic pains and B knee pains since her fall. She goes to see the MD next week for  follow up. She is having a hard time caring for her  whom is disabled.        OBJECTIVE    Modality rationale: decrease edema, decrease inflammation, and decrease pain to improve the patients ability to tolerate ADLs/amb   Min Type Additional Details    [] Estim:  []Unatt       []IFC  []Premod                        []Other:  []w/ice   []w/heat  Position:  Location:    [] Estim: []Att    []TENS instruct  []NMES                    []Other:  []w/US   []w/ice   []w/heat  Position:  Location:    []  Traction: [] Cervical       []Lumbar                       [] Prone          []Supine                       []Intermittent   []Continuous Lbs:  [] before manual  [] after manual    []  Ultrasound: []Continuous   [] Pulsed                           []1MHz   []3MHz W/cm2:  Location:    []  Iontophoresis with dexamethasone         Location: [] Take home patch   [] In clinic   10 [x]  Ice     []  heat  []  Ice massage  []  Laser   []  Anodyne Position: supine, wedge for B Les  Location: B knees and SI joint    []  Laser with stim  []  Other:  Position:  Location:    []  Vasopneumatic Device    []  Right     []  Left  Pre-treatment girth:  Post-treatment girth:  Measured at (location):  Pressure:       [] lo [] med [] hi   Temperature: [] lo [] med [] hi   [x] Skin assessment post-treatment:  [x]intact []redness- no adverse reaction    []redness - adverse reaction:       34 min Therapeutic Exercise:  [x] See flow sheet :   Rationale: increase ROM and increase strength to improve the patients ability to perform ADLs/amb with more ease      10 min Manual Therapy:  [x]  See flow sheet : leg lengthening for right upslip; MET for right AI; shotgun technique; MET for left/left sacral torsion   Rationale: improve coordination, improve balance, and increase proprioception  to improve the patients ability to perform ADLs amb with more ease      With   [] TE   [] TA   [] neuro   [] other: Patient Education: [x] Review HEP    [] Progressed/Changed HEP based on:   [] positioning   [] body mechanics   [] transfers   [] heat/ice application    [] other:      Other Objective/Functional Measures:   Educated on home ice pack with rubbing alcohol  B SI joint TTP  Swelling to B knees  Educated no increase in pain during sessions     Pain Level (0-10 scale) post treatment: 5/10    ASSESSMENT/Changes in Function: Pt continues with increased pain since recent fall on her ramp at home. She has pelvic malalignment due to fall and limping from antalgic gait causing sciatica symptoms. Will continue to progress as able for reduce pain in order to care for her . Patient will continue to benefit from skilled PT services to modify and progress therapeutic interventions, address functional mobility deficits, address ROM deficits, address strength deficits, analyze and address soft tissue restrictions, analyze and cue movement patterns, analyze and modify body mechanics/ergonomics, assess and modify postural abnormalities, address imbalance/dizziness, and instruct in home and community integration to attain remaining goals.      [x]  See Plan of Care  []  See progress note/recertification  []  See Discharge Summary         Progress towards goals / Updated goals:  Short Term Goals: To be accomplished in 1 weeks:  1. Patient will report compliance with initial HEP to optimize therapy outcomes. Status at eval: established              Current: Pt reports compliance 8/11/2022              Progressing-patient reports compliance, formally issued HEP this visit. (8/16/22)  Long Term Goals: To be accomplished in 6 weeks:  1. Patient will improve FOTO score to at least 46/100 points in order to demonstrate functional improvement. Status at eval: 44/100               2. Patient will report no more than \"limited a little\" with \"walking more than a mile\" with FOTO in order to demonstrate improved tolerance to recreational tasks. Status at eval: \"limited a lot\"               3. Patient will report no greater than 6/10 pain to B knees in order to perform ADLs with increased ease. Status at eval: 10/10               4. Patient will improve B hip flex strength to at least 4/5 with MMT in order to perform functional tasks with increased ease. Status at eval: 3+/5 B               5. Patient will be able to maintain SLS for at least 10 sec B in order to perform daily tasks with increased stability.                             Status at eval: 3 sec right, 13 sec left with compensation    PLAN  [x]  Upgrade activities as tolerated     [x]  Continue plan of care  []  Update interventions per flow sheet       []  Discharge due to:_  []  Other:_      Jack Jones PTA 8/25/2022  10:39 AM    Future Appointments   Date Time Provider Rony Salgado   8/25/2022 10:30 AM Jazlyn Coburn PTA [facility]   8/30/2022  8:45 AM DO RON Zendejas AMB   8/30/2022 11:15 AM Rosita Amaya, PT YGCHNIX [facility]   9/1/2022 10:30 AM Vishnu Walton, PT [id number] [facility]   9/6/2022  9:45 AM Jonaatn López, PTA [facility]   9/8/2022 10:30 AM [facility], PT [facility]   9/13/2022 11:15 AM [facility], PT [id number] [facility]   9/15/2022 10:30 AM [facility], PT [facility]   9/20/2022 10:30 AM Aleyda Cooper, PT [facility]   10/6/2022  9:00 AM Ivis Buchanan MD [facility]

## 2022-08-30 ENCOUNTER — HOSPITAL ENCOUNTER (OUTPATIENT)
Dept: OCCUPATIONAL MEDICINE | Age: 52
Discharge: HOME | End: 2022-08-30
Attending: FAMILY MEDICINE

## 2022-08-30 ENCOUNTER — OFFICE VISIT (OUTPATIENT)
Dept: ORTHOPEDIC SURGERY | Age: 52
End: 2022-08-30
Payer: COMMERCIAL

## 2022-08-30 ENCOUNTER — HOSPITAL ENCOUNTER (OUTPATIENT)
Dept: PHYSICAL THERAPY | Age: 52
Discharge: HOME | End: 2022-08-30
Payer: COMMERCIAL

## 2022-08-30 VITALS
WEIGHT: 293 LBS | HEART RATE: 91 BPM | OXYGEN SATURATION: 97 % | RESPIRATION RATE: 14 BRPM | HEIGHT: 66 IN | BODY MASS INDEX: 47.09 KG/M2

## 2022-08-30 DIAGNOSIS — M22.2X2 PATELLOFEMORAL SYNDROME, BILATERAL: Primary | ICD-10-CM

## 2022-08-30 DIAGNOSIS — M22.2X1 PATELLOFEMORAL SYNDROME, BILATERAL: Primary | ICD-10-CM

## 2022-08-30 DIAGNOSIS — M70.51 PES ANSERINUS BURSITIS OF BOTH KNEES: ICD-10-CM

## 2022-08-30 DIAGNOSIS — M22.2X1 PATELLOFEMORAL SYNDROME, BILATERAL: ICD-10-CM

## 2022-08-30 DIAGNOSIS — M70.52 PES ANSERINUS BURSITIS OF BOTH KNEES: ICD-10-CM

## 2022-08-30 DIAGNOSIS — M22.2X2 PATELLOFEMORAL SYNDROME, BILATERAL: ICD-10-CM

## 2022-08-30 DIAGNOSIS — M65.331 TRIGGER FINGER, RIGHT MIDDLE FINGER: ICD-10-CM

## 2022-08-30 PROCEDURE — 99214 OFFICE O/P EST MOD 30 MIN: CPT | Performed by: FAMILY MEDICINE

## 2022-08-30 PROCEDURE — 97140 MANUAL THERAPY 1/> REGIONS: CPT

## 2022-08-30 PROCEDURE — 97110 THERAPEUTIC EXERCISES: CPT

## 2022-08-30 RX ORDER — DICLOFENAC SODIUM 50 MG/1
50 TABLET, DELAYED RELEASE ORAL 2 TIMES DAILY WITH MEALS
Qty: 60 TABLET | Refills: 1 | Status: SHIPPED | OUTPATIENT
Start: 2022-08-30

## 2022-08-30 NOTE — PROGRESS NOTES
HISTORY OF PRESENT ILLNESS    Harper Weston 1970 is a 45y.o. year old female comes in today to be evaluated and treated for: knee pain B/L    Since last appt has noticed pain has improved some. Pain level 7/10. Using voltaren 50mg but gel not covered with benefit. PT is helping 6-7 sessions so far. Did have fall onto knees which fired up pain. Trigger finger right middle still similar did try splint but tape better. IMAGING: XR knees pending    History reviewed. No pertinent surgical history. Social History     Socioeconomic History    Marital status:    Tobacco Use    Smoking status: Never    Smokeless tobacco: Never   Vaping Use    Vaping Use: Never used   Substance and Sexual Activity    Alcohol use: Yes     Comment: Rare    Drug use: Yes     Types: Marijuana     Comment: PRN when she can't sleep, edibles    Sexual activity: Never     Social Determinants of Health     Physical Activity: Unknown    Days of Exercise per Week: 0 days     Current Outpatient Medications   Medication Sig Dispense Refill    albuterol (PROVENTIL HFA, VENTOLIN HFA, PROAIR HFA) 90 mcg/actuation inhaler Take 1 Puff by inhalation every four (4) hours as needed for Wheezing or Shortness of Breath. Indications: bronchospasm 18 g 2    pantoprazole (PROTONIX) 40 mg tablet Take 1 Tablet by mouth in the morning. 90 Tablet 1    dulaglutide (Trulicity) 4.5 UI/5.6 mL pnij 4.5 mg by SubCUTAneous route every seven (7) days. 4 Each 3    Insulin Needles, Disposable, 31 gauge x 5/16\" ndle Use with insulin lantus nightly 50 Each 5    insulin glargine (LANTUS,BASAGLAR) 100 unit/mL (3 mL) inpn 10 Units by SubCUTAneous route nightly for 60 days.  3 mL 1    lisinopriL (PRINIVIL, ZESTRIL) 5 mg tablet TAKE 1 TABLET BY MOUTH EVERY DAY 90 Tablet 1    atorvastatin (LIPITOR) 20 mg tablet TAKE 1 TABLET BY MOUTH EVERY DAY 90 Tablet 1    atenoloL (TENORMIN) 50 mg tablet TAKE 1 TABLET BY MOUTH EVERY DAY 90 Tablet 1    OneTouch Verio test strips strip       OneTouch Delica Plus Lancet 33 gauge misc       diclofenac EC (VOLTAREN) 50 mg EC tablet Take 1 Tablet by mouth two (2) times daily (with meals). 60 Tablet 1    diclofenac (VOLTAREN) 1 % gel Apply 2 g to affected area every six (6) hours as needed for Pain (right middle finger). 100 g 1    metFORMIN ER (GLUCOPHAGE XR) 500 mg tablet Take 1,000 mg by mouth two (2) times a day. Past Medical History:   Diagnosis Date    Arthritis     Diabetes ([facility] [address]      Family History   Problem Relation Age of Onset    Hypertension Mother     Cancer Father     Cancer Paternal Uncle     Cancer Paternal Grandmother     Diabetes Paternal Grandfather     Cancer Paternal Aunt          ROS:  + swell knees, some numb lower legs    Objective:  Pulse 91   Resp 14   Ht 5' 6\" (1.676 m)   Wt 293 lb (132.9 kg)   SpO2 97%   BMI 47.29 kg/m²   NEURO:  Sensation intact light touch B/L lower extremities. Reflexes +2/4 patellar and Achilles bilaterally. MS:  LE Strength +5/5 bilateral .   bilateral  Knee:  1+ Effusion . Lachman negative. Valgus negative. Varus negative. negative joint line tenderness medial, lateral.  Ramiro negative. Posterior drawer negative. Noble compression test negative. Patellar apprehension negative. Patellar facet positive tender to palpation medial some crepitance bilateral .  Pes anserine negative TTP bilateral.  RIGHT HAND: painful nodule volar 3rd MCP. Assessment/Plan:     ICD-10-CM ICD-9-CM    1. Patellofemoral syndrome, bilateral  M22.2X1 719.46 XR KNEE LT MAX 2 VWS    M22.2X2  XR KNEE RT MAX 2 VWS      diclofenac EC (VOLTAREN) 50 mg EC tablet      2. Pes anserinus bursitis of both knees  M70.51 726.61     M70.52        3. Trigger finger, right middle finger  M65.331 727.03 diclofenac EC (VOLTAREN) 50 mg EC tablet          Patient (or guardian if minor) verbalizes understanding of evaluation and plan.     Will continue HEP, PT, and Rx for voltaren 50mg w/ voltaren gel from O[facility]  as above and plan follow-up 6 weeks. Still needs a lot of PT to improve PFS/pes anserine and consider inject trigger finger in future.

## 2022-08-30 NOTE — LETTER
8/30/2022    Patient: Chris Roth   YOB: 1970   Date of Visit: 8/30/2022     Luane Mcburney, MD  [address] [place] [address]    Dear Luane Mcburney, MD,      Thank you for referring Ms. Vandana Wallace to [place] [id number]. for evaluation. My notes for this consultation are attached. If you have questions, please do not hesitate to call me. I look forward to following your patient along with you.       Sincerely,    Nicole Fairly, DO

## 2022-08-30 NOTE — PROGRESS NOTES
PT DAILY TREATMENT NOTE     Patient Name: Jabari Scott  Date:2022  : 1970  [x]  Patient  Verified  Payor: Syed Scruggs / Plan: Edgardo Garcia PPO / Product Type: PPO /    In time:11:20A  Out time:12:10P  Total Treatment Time (min): 50  Visit #: 6 of 12      Treatment Area: Right knee pain [M25.561]  Left knee pain [M25.562]    SUBJECTIVE  Pain Level (0-10 scale): 7/10  Any medication changes, allergies to medications, adverse drug reactions, diagnosis change, or new procedure performed?: [x] No    [] Yes (see summary sheet for update)  Subjective functional status/changes:   [] No changes reported  Patient reports she has not been doing great. She is having to help care for her . She felt ok after last visit. She had x-rays done but has to go back to have them reviewed. Pain has not been as bad since starting therapy. She has not been sleeping much. Sitting causes her butt to hurt and lying down causes her knees and shoulders to hurt. She has to push her  away from the doors to keep him from getting out. He has a nurse but only for 11 hours/week. Her doctor has recommended she continue with therapy.      OBJECTIVE    Modality rationale: decrease inflammation and decrease pain to improve the patients ability to perform ADLs with increased ease   Min Type Additional Details    [] Estim:  []Unatt       []IFC  []Premod                        []Other:  []w/ice   []w/heat  Position:  Location:    [] Estim: []Att    []TENS instruct  []NMES                    []Other:  []w/US   []w/ice   []w/heat  Position:  Location:    []  Traction: [] Cervical       []Lumbar                       [] Prone          []Supine                       []Intermittent   []Continuous Lbs:  [] before manual  [] after manual    []  Ultrasound: []Continuous   [] Pulsed                           []1MHz   []3MHz W/cm2:  Location:    []  Iontophoresis with dexamethasone         Location: [] Take home patch   [] In clinic 10 [x]  Ice     []  heat  []  Ice massage  []  Laser   []  Anodyne Position: supine with LEs and HOB elevated  Location: low back    []  Laser with stim  []  Other:  Position:  Location:    []  Vasopneumatic Device    []  Right     []  Left  Pre-treatment girth:  Post-treatment girth:  Measured at (location):  Pressure:       [] lo [] med [] hi   Temperature: [] lo [] med [] hi   [x] Skin assessment post-treatment:  [x]intact []redness- no adverse reaction    []redness - adverse reaction:       28 min Therapeutic Exercise:  [x] See flow sheet :   Rationale: increase ROM, increase strength, and increase proprioception to improve the patients ability to perform ADLs with increased ease      12 min Manual Therapy:  leg lengthening for right upslip, MET for right AI, shotgun technique; STM/TPR to B lateral hamstring   The manual therapy interventions were performed at a separate and distinct time from the therapeutic activities interventions.   Rationale: decrease pain, increase ROM, increase tissue extensibility, and decrease trigger points to perform ADLs with increased ease            With   [x] TE   [] TA   [] neuro   [] other: Patient Education: [x] Review HEP    [] Progressed/Changed HEP based on:   [] positioning   [] body mechanics   [] transfers   [] heat/ice application    [] other:      Other Objective/Functional Measures:       Educated on performing seated trunk flex at home (either with table or with leaning forward while seated  Educated on present use of cane to improved stability and pressure to knees/back with ambulation  Hip flex MMT: right 4 left 3+ (pain to anterior knee B)  SLS: 1 sec B due to pain to anterior knee  Right upslip, left AI, left sacral torsion  TTP to B sacral borders   Pain to right hamstring with isometric hip ext on right-activity held  Trigger points to lateral hamstrings  Educated on ability to trial heat to hamstring due to tightness but ability to trial ice if just generally more sore overall  Educated on 3 way hamstring stretch for home      Pain Level (0-10 scale) post treatment: 7/10    ASSESSMENT/Changes in Function: Patient continues to report increased knee, back, and buttocks pain secondary to recent fall and caring for . She demonstrates min improved overall hip flex strength since start of care and continues to demonstrate impaired SL balance with testing most limited secondary to pain. She continues to ambulate with decreased knee ext on left. She reports continued compliance with HEP and therapist recommendations for activity modification/modality use for symptom relief at home. Patient will continue to benefit from skilled PT services to modify and progress therapeutic interventions, address functional mobility deficits, address ROM deficits, address strength deficits, analyze and address soft tissue restrictions, analyze and cue movement patterns, analyze and modify body mechanics/ergonomics, assess and modify postural abnormalities, address imbalance/dizziness, and instruct in home and community integration to attain remaining goals. []  See Plan of Care  []  See progress note/recertification  []  See Discharge Summary         Progress towards goals / Updated goals:  Short Term Goals: To be accomplished in 1 weeks:  1. Patient will report compliance with initial HEP to optimize therapy outcomes. Status at eval: established              Current: Pt reports compliance 8/11/2022              Progressing-patient reports compliance, formally issued HEP this visit. (8/16/22)  Long Term Goals: To be accomplished in 6 weeks:  1. Patient will improve FOTO score to at least 46/100 points in order to demonstrate functional improvement. Status at eval: 44/100               2. Patient will report no more than \"limited a little\" with \"walking more than a mile\" with FOTO in order to demonstrate improved tolerance to recreational tasks. Status at eval: \"limited a lot\"               3. Patient will report no greater than 6/10 pain to B knees in order to perform ADLs with increased ease. Status at eval: 10/10               4. Patient will improve B hip flex strength to at least 4/5 with MMT in order to perform functional tasks with increased ease. Status at eval: 3+/5 B               5. Patient will be able to maintain SLS for at least 10 sec B in order to perform daily tasks with increased stability.                             Status at eval: 3 sec right, 13 sec left with compensation     Progressing-SLS: 1 sec B due to pain (8/30/22)       PLAN  [x]  Upgrade activities as tolerated     [x]  Continue plan of care  []  Update interventions per flow sheet       []  Discharge due to:_  []  Other:_      Girma Lindo, PT 8/30/2022  10:15 AM    Future Appointments   Date Time Provider Rony Salgado   8/30/2022 11:15 AM Rosita Morales, PT [facility]   9/1/2022 10:30 AM Mana Burch, PT [facility]   9/6/2022  9:45 AM Margret Desai, PTA [facility]   9/8/2022 10:30 AM Rosita Morales, PT [facility]   9/13/2022 11:15 AM Benjamin Leon [facility]   9/15/2022 10:30 AM Rosita Morales, PT [facility]   9/20/2022 10:30 AM Clifton Gonzalez, PT [facility]   10/6/2022  9:00 AM Elsie Buchanan MD [facility]

## 2022-09-06 ENCOUNTER — TELEPHONE (OUTPATIENT)
Dept: PHYSICAL THERAPY | Age: 52
End: 2022-09-06

## 2022-09-08 ENCOUNTER — HOSPITAL ENCOUNTER (OUTPATIENT)
Dept: PHYSICAL THERAPY | Age: 52
Discharge: HOME | End: 2022-09-08
Payer: COMMERCIAL

## 2022-09-08 PROCEDURE — 97140 MANUAL THERAPY 1/> REGIONS: CPT

## 2022-09-08 PROCEDURE — 97110 THERAPEUTIC EXERCISES: CPT

## 2022-09-08 PROCEDURE — 97530 THERAPEUTIC ACTIVITIES: CPT

## 2022-09-08 NOTE — PROGRESS NOTES
[facility] - Caitlin Muir  [address] [facility]  [phone] fax    Physical Therapy Progress Note  Patient name: Anson Grajeda Start of Care: 2022   Referral source: Benton العراقي DO : 1970   Medical/Treatment Diagnosis: Right knee pain [M25.561]  Left knee pain [M25.562]  Payor: Matthias Cam / Plan: MARTÍN AEBRANDON PPO / Product Type: PPO /  Onset Date:ongoing for 1 year                  Prior Hospitalization: see medical history Provider#: [id number]   Medications: Verified on Patient Summary List    Comorbidities: arthritis, diabetes, history of back pain  Prior Level of Function:lives in 1 story home with ramp; enjoys walking for recreation; helps care for  (frequent falls)  Visits from Start of Care: 7    Missed Visits: 3    Established Goals:         Excellent           Good         Limited           None  [] Increased ROM   []  []  []  []  [x] Increased Strength  []  [x]  []  []  [x] Increased Mobility  []  [x]  []  []   [x] Decreased Pain   []  []  [x]  []  [] Decreased Swelling  []  []  []  []    Key Functional Changes: subjective reports of improvement, decreased max pain, improved hip flex strength    Updated Goals: to be achieved in 4 weeks:  1. Patient will improve FOTO score to at least 46/100  points in order to demonstrate functional improvement. Status at last progress note: 44/100  2. Patient will report no more than \"only longterm\" with \"squatting\" with FOTO in order to demonstrate improved tolerance to daily tasks. Status at last progress note: \"impossible\"  3. Patient will report no greater than 5/10 pain to knees in order to perform ADLs with increased ease. Status at last progress note: 710  4. Patient will be able to navigate at least 4 stairs with reciprocal pattern and no more than unilateral handrail in order to navigate community with increased ease.    Status at last progress note: pain and difficulty with stairs; worse with ascending  4. Patient will report at least 50% improvement in standing/ambulation tolerance in order to perform functional tasks with increased ease. Status at last progress note: improved standing tolerance reported    ASSESSMENT/RECOMMENDATIONS: Patient making slow, steady progress towards goals in therapy. She reports improved standing tolerance contributing to increased ease with household chores. Patient also reports improved ambulation tolerance and decreased pain with activity with use of ice to knees for symptom management. She reports continued pain/difficulty with navigating stairs (more with ascending). FOTO score remains unchanged since start of care. She demonstrates improved hip flex strength (4-/5) with back/hip pain contributing to decreased strength. She reports increased back/hip pain following recent fall contributing to additional limitation with household management and standing/ambulation. She has demonstrated pelvic alignment with pain contributing to changes in positioning and continued impaired gait mechanics (antalgic with decreased knee ROM B) as likely contributing factors. Patient also demonstrates tightness/trigger points to B gastrocs and hamstrings further limiting knee ROM. Single leg balance has improved with patient able to maintain SLS for 17 sec on the left and 15 sec on the right. She reports compliance with HEP and therapist recommendations for symptom management. Patient would benefit from continued skilled outpatient PT to address remaining deficits and improve overall QOL.     [x]Continue therapy  at a frequency of  2-3 x per week for 4 weeks  []Continue therapy with the following recommended changes:_____________________      _____________________________________________________________________  []Discontinue therapy progressing towards or have reached established goals  []Discontinue therapy due to lack of appreciable progress towards goals  []Discontinue therapy due to lack of attendance or compliance  []Await Physician's recommendations/decisions regarding therapy  []Other:________________________________________________________________    Thank you for this referral.   Krish Tan, PT 9/8/2022 3:23 PM    NOTE TO PHYSICIAN:  PLEASE COMPLETE THE ORDERS BELOW AND   FAX TO [facility]: [phone]  If you are unable to process this request in 24 hours please contact our office: [phone]    [x]  I have read the above report and request that my patient continue as recommended. I have read the above report and request that my patient continue therapy with the following changes/special instructions:____________________________________  I have read the above report and request that my patient be discharged from therapy.     Physicians signature: ______________________________Date: ______Time:______     Saud Maria DO

## 2022-09-08 NOTE — PROGRESS NOTES
PT DAILY TREATMENT NOTE     Patient Name: Jagruti Fuller  Date:2022  : 1970  [x]  Patient  Verified  Payor: Shannan Meek / Plan: Eugenia Mackay PPO / Product Type: PPO /    In time:11:58A  Out time:12:42P  Total Treatment Time (min): 44  Visit #: 7 of 12      Treatment Area: Right knee pain [M25.561]  Left knee pain [M25.562]    SUBJECTIVE  Pain Level (0-10 scale): 6/10  Any medication changes, allergies to medications, adverse drug reactions, diagnosis change, or new procedure performed?: [x] No    [x] Yes (see summary sheet for update)  Subjective functional status/changes:   [] No changes reported  Patient reports she is able to stand longer. She is able to wash dishes without having to sit. Use of ice has helped with her pain and she can walk longer distances without them hurting as much. Navigating stairs is still difficult; going up is more painful. Her back and hips are bothering her. The hamstring stretch has helped with her back pain/cramping in her thighs but has caused pain in her calves. She has been working on her balance at home. OBJECTIVE      10 min Therapeutic Exercise:  [] See flow sheet :   Rationale: increase ROM, increase strength, improve coordination, improve balance, and increase proprioception to improve the patients ability to perform ADLs with increased ease    22 min Therapeutic Activity:  []  See flow sheet : FOTO, goal assessment, patient education   Rationale: increase ROM, increase strength, improve coordination, improve balance, and increase proprioception  to improve the patients ability to perform ADLs with increased ease and determine therapy plan       12 min Manual Therapy:  STM/DTM/TPR to B gastrocs   The manual therapy interventions were performed at a separate and distinct time from the therapeutic activities interventions.   Rationale: decrease pain, increase ROM, increase tissue extensibility, and decrease trigger points to perform daily tasks with increased ease            With   [] TE   [x] TA   [] neuro   [] other: Patient Education: [x] Review HEP    [] Progressed/Changed HEP based on:   [] positioning   [] body mechanics   [] transfers   [] heat/ice application    [x] other: POC, examination findings, use of heat to calf musculature to reduce tightness, continued use of ice to reduce inflammation to knees, performing seated hamstring stretch with foot in neutral position to reduce overstretching/pain to calves     Other Objective/Functional Measures: FOTO: 44/`00  Pain at max: 7/10  B hip flex MMT: right 4- (pain to hip) left 4- (pain to hip)  SLS: left 17 sec, 15 sec   Trigger points to left gastroc; hypertonicity to right gastroc    Reviewed SKTC stretch for HEP    Pain Level (0-10 scale) post treatment: 6/10    ASSESSMENT/Changes in Function: SEE PROGRESS NOTE    Patient will continue to benefit from skilled PT services to modify and progress therapeutic interventions, address functional mobility deficits, address ROM deficits, address strength deficits, analyze and address soft tissue restrictions, analyze and cue movement patterns, analyze and modify body mechanics/ergonomics, assess and modify postural abnormalities, address imbalance/dizziness, and instruct in home and community integration to attain remaining goals. []  See Plan of Care  [x]  See progress note/recertification  []  See Discharge Summary         Progress towards goals / Updated goals:      SEE PROGRESS NOTE FOR UPDATED GOALS    Short Term Goals: To be accomplished in 1 weeks:  1. Patient will report compliance with initial HEP to optimize therapy outcomes. Status at eval: established              Current: Pt reports compliance 8/11/2022              Progressing-patient reports compliance, formally issued HEP this visit. (8/16/22)  MET  Long Term Goals: To be accomplished in 6 weeks:  1.  Patient will improve FOTO score to at least 46/100 points in order to demonstrate functional improvement. Status at eval: 44/100  Progressing-44/100                 2. Patient will report no more than \"limited a little\" with \"walking more than a mile\" with FOTO in order to demonstrate improved tolerance to recreational tasks. Status at eval: \"limited a lot\"  Progressing-limited a lot               3. Patient will report no greater than 6/10 pain to B knees in order to perform ADLs with increased ease. Status at eval: 10/10   Progressing-7/10               4. Patient will improve B hip flex strength to at least 4/5 with MMT in order to perform functional tasks with increased ease. Status at eval: 3+/5 B  Progressing-4-/5               5. Patient will be able to maintain SLS for at least 10 sec B in order to perform daily tasks with increased stability.                             Status at eval: 3 sec right, 13 sec left with compensation                               Progressing-SLS: 1 sec B due to pain (8/30/22)  MET-left 17 sec, right 15 sec       PLAN  [x]  Upgrade activities as tolerated     [x]  Continue plan of care  []  Update interventions per flow sheet       []  Discharge due to:_  []  Other:_      Chance Cantrell, PT 9/8/2022  10:13 AM    Future Appointments   Date Time Provider Rony Salgado   9/8/2022 12:00 PM Denise Luciano [facility]   9/13/2022 11:15 AM Tova Cintron [facility]   9/15/2022 10:30 AM Rosita Mcmullen PT [facility]   9/20/2022 10:30 AM Michela Zavaleta PTA [facility]   10/6/2022  9:00 AM Valorie Buchanan MD [facility]

## 2022-09-15 ENCOUNTER — HOSPITAL ENCOUNTER (OUTPATIENT)
Dept: PHYSICAL THERAPY | Age: 52
Discharge: HOME | End: 2022-09-15
Payer: COMMERCIAL

## 2022-09-15 PROCEDURE — 97530 THERAPEUTIC ACTIVITIES: CPT

## 2022-09-15 PROCEDURE — 97110 THERAPEUTIC EXERCISES: CPT

## 2022-09-15 PROCEDURE — 97112 NEUROMUSCULAR REEDUCATION: CPT

## 2022-09-15 NOTE — PROGRESS NOTES
PT DAILY TREATMENT NOTE     Patient Name: Dony Obrien  Date:9/15/2022  : 1970  [x]  Patient  Verified  Payor: Tony Barrera / Plan: Billie Lama PPO / Product Type: PPO /    In time:1030  Out time:1115  Total Treatment Time (min): 45  Visit #: 1 of     Medicare/BCBS Only   Total Timed Codes (min):   1:1 Treatment Time:         Treatment Area: Right knee pain [M25.561]  Left knee pain [M25.562]    SUBJECTIVE  Pain Level (0-10 scale): 5  Any medication changes, allergies to medications, adverse drug reactions, diagnosis change, or new procedure performed?: [x] No    [] Yes (see summary sheet for update)  Subjective functional status/changes:   [] No changes reported  Patient reported improved pain upon arrival    OBJECTIVE    25 min Therapeutic Exercise:  [] See flow sheet :   Rationale: increase ROM and increase strength to improve the patients ability to perform ADLs    10 min Therapeutic Activity:  []  See flow sheet :   Rationale: increase strength and improve coordination  to improve the patients ability to perform ADLs     10 min Neuromuscular Re-education:  []  See flow sheet :   Rationale: improve balance and increase proprioception  to improve the patients ability to perform ADLs    With   [] TE   [] TA   [] neuro   [] other: Patient Education: [x] Review HEP    [] Progressed/Changed HEP based on:   [] positioning   [] body mechanics   [] transfers   [] heat/ice application    [] other:      Other Objective/Functional Measures:   - added incline stretch 2 x 30   - progressed therex per flow sheet  Pain Level (0-10 scale) post treatment: 5    ASSESSMENT/Changes in Function: progressed therex per flow sheet with good tolerance and no increase in pain.     Patient will continue to benefit from skilled PT services to modify and progress therapeutic interventions, address functional mobility deficits, address ROM deficits, address strength deficits, analyze and address soft tissue restrictions, and analyze and cue movement patterns to attain remaining goals. [x]  See Plan of Care  []  See progress note/recertification  []  See Discharge Summary         Progress towards goals / Updated goals:  Updated Goals: to be achieved in 4 weeks:  1. Patient will improve FOTO score to at least 46/100  points in order to demonstrate functional improvement. Status at last progress note: 44/100  2. Patient will report no more than \"only FPC\" with \"squatting\" with FOTO in order to demonstrate improved tolerance to daily tasks. Status at last progress note: \"impossible\"  3. Patient will report no greater than 5/10 pain to knees in order to perform ADLs with increased ease. Status at last progress note: 7/10  4. Patient will be able to navigate at least 4 stairs with reciprocal pattern and no more than unilateral handrail in order to navigate community with increased ease. Status at last progress note: pain and difficulty with stairs; worse with ascending  4. Patient will report at least 50% improvement in standing/ambulation tolerance in order to perform functional tasks with increased ease.               Status at last progress note: improved standing tolerance reported       PLAN  []  Upgrade activities as tolerated     [x]  Continue plan of care  []  Update interventions per flow sheet       []  Discharge due to:_  []  Other:_      Manisha Prater, PTA 9/15/2022  10:35 AM    Future Appointments   Date Time Provider Rony Salgado   9/20/2022 10:30 AM Magda Kamara PTA [facility]   10/6/2022  9:00 AM Rebecca Buchanan MD [facility]

## 2022-11-07 DIAGNOSIS — R06.02 SHORTNESS OF BREATH: ICD-10-CM

## 2022-11-08 RX ORDER — ALBUTEROL SULFATE 90 UG/1
AEROSOL, METERED RESPIRATORY (INHALATION)
Qty: 8.5 EACH | Refills: 2 | Status: SHIPPED | OUTPATIENT
Start: 2022-11-08 | End: 2022-11-18 | Stop reason: SDUPTHER

## 2022-11-18 ENCOUNTER — OFFICE VISIT (OUTPATIENT)
Dept: FAMILY MEDICINE CLINIC | Age: 52
End: 2022-11-18
Payer: COMMERCIAL

## 2022-11-18 VITALS
WEIGHT: 293 LBS | BODY MASS INDEX: 47.09 KG/M2 | OXYGEN SATURATION: 98 % | SYSTOLIC BLOOD PRESSURE: 107 MMHG | HEIGHT: 66 IN | DIASTOLIC BLOOD PRESSURE: 62 MMHG | HEART RATE: 79 BPM | RESPIRATION RATE: 17 BRPM

## 2022-11-18 DIAGNOSIS — R80.9 MICROALBUMINURIA: ICD-10-CM

## 2022-11-18 DIAGNOSIS — M65.331 TRIGGER FINGER, RIGHT MIDDLE FINGER: ICD-10-CM

## 2022-11-18 DIAGNOSIS — I10 ESSENTIAL HYPERTENSION: ICD-10-CM

## 2022-11-18 DIAGNOSIS — K21.9 GASTROESOPHAGEAL REFLUX DISEASE WITHOUT ESOPHAGITIS: ICD-10-CM

## 2022-11-18 DIAGNOSIS — M22.2X2 PATELLOFEMORAL SYNDROME, BILATERAL: ICD-10-CM

## 2022-11-18 DIAGNOSIS — E11.65 TYPE 2 DIABETES MELLITUS WITH HYPERGLYCEMIA, UNSPECIFIED WHETHER LONG TERM INSULIN USE (HCC): Primary | ICD-10-CM

## 2022-11-18 DIAGNOSIS — E78.5 HYPERLIPIDEMIA, UNSPECIFIED HYPERLIPIDEMIA TYPE: ICD-10-CM

## 2022-11-18 DIAGNOSIS — M22.2X1 PATELLOFEMORAL SYNDROME, BILATERAL: ICD-10-CM

## 2022-11-18 DIAGNOSIS — R06.02 SHORTNESS OF BREATH: ICD-10-CM

## 2022-11-18 LAB — HBA1C MFR BLD HPLC: 10.9 %

## 2022-11-18 PROCEDURE — 83036 HEMOGLOBIN GLYCOSYLATED A1C: CPT | Performed by: STUDENT IN AN ORGANIZED HEALTH CARE EDUCATION/TRAINING PROGRAM

## 2022-11-18 PROCEDURE — 3078F DIAST BP <80 MM HG: CPT | Performed by: STUDENT IN AN ORGANIZED HEALTH CARE EDUCATION/TRAINING PROGRAM

## 2022-11-18 PROCEDURE — 3046F HEMOGLOBIN A1C LEVEL >9.0%: CPT | Performed by: STUDENT IN AN ORGANIZED HEALTH CARE EDUCATION/TRAINING PROGRAM

## 2022-11-18 PROCEDURE — 99214 OFFICE O/P EST MOD 30 MIN: CPT | Performed by: STUDENT IN AN ORGANIZED HEALTH CARE EDUCATION/TRAINING PROGRAM

## 2022-11-18 PROCEDURE — 3074F SYST BP LT 130 MM HG: CPT | Performed by: STUDENT IN AN ORGANIZED HEALTH CARE EDUCATION/TRAINING PROGRAM

## 2022-11-18 RX ORDER — METFORMIN HYDROCHLORIDE 500 MG/1
TABLET, EXTENDED RELEASE ORAL
Qty: 120 TABLET | Refills: 3 | Status: SHIPPED | OUTPATIENT
Start: 2022-11-18

## 2022-11-18 RX ORDER — DULAGLUTIDE 4.5 MG/.5ML
4.5 INJECTION, SOLUTION SUBCUTANEOUS
Qty: 4 EACH | Refills: 3 | Status: CANCELLED | OUTPATIENT
Start: 2022-11-18

## 2022-11-18 RX ORDER — ALBUTEROL SULFATE 90 UG/1
AEROSOL, METERED RESPIRATORY (INHALATION)
Qty: 8.5 EACH | Refills: 2 | Status: SHIPPED | OUTPATIENT
Start: 2022-11-18

## 2022-11-18 RX ORDER — ATENOLOL 50 MG/1
50 TABLET ORAL DAILY
Qty: 90 TABLET | Refills: 1 | Status: SHIPPED | OUTPATIENT
Start: 2022-11-18

## 2022-11-18 RX ORDER — ATORVASTATIN CALCIUM 20 MG/1
20 TABLET, FILM COATED ORAL DAILY
Qty: 90 TABLET | Refills: 1 | Status: SHIPPED | OUTPATIENT
Start: 2022-11-18

## 2022-11-18 RX ORDER — SEMAGLUTIDE 1.34 MG/ML
1 INJECTION, SOLUTION SUBCUTANEOUS
Qty: 8 EACH | Refills: 0 | Status: SHIPPED | OUTPATIENT
Start: 2022-11-18 | End: 2022-11-29

## 2022-11-18 RX ORDER — DICLOFENAC SODIUM 50 MG/1
50 TABLET, DELAYED RELEASE ORAL 2 TIMES DAILY WITH MEALS
Qty: 60 TABLET | Refills: 1 | Status: SHIPPED | OUTPATIENT
Start: 2022-11-18

## 2022-11-18 RX ORDER — LISINOPRIL 5 MG/1
5 TABLET ORAL DAILY
Qty: 90 TABLET | Refills: 1 | Status: SHIPPED | OUTPATIENT
Start: 2022-11-18

## 2022-11-18 RX ORDER — PANTOPRAZOLE SODIUM 40 MG/1
40 TABLET, DELAYED RELEASE ORAL DAILY
Qty: 90 TABLET | Refills: 1 | Status: SHIPPED | OUTPATIENT
Start: 2022-11-18

## 2022-11-18 NOTE — PROGRESS NOTES
Benito Barron is a 45 y.o. female presenting today for Follow Up Chronic Condition (Discuss IBS and if she has it. Has had PAP in last 5 years, through [facility]. States needs to make appt for colonoscopy. Not interested in any vaccines. )  . Chief Complaint   Patient presents with    Follow Up Chronic Condition     Discuss IBS and if she has it. Has had PAP in last 5 years, through [employer] [facility]. States needs to make appt for colonoscopy. Not interested in any vaccines. HPI:  Benito Barron presents to the office today for follow up. Patient has a PMHx of T2DM, HTN, HLD.    T2DM: Patient is on Lantus 10 units nightly along with metformin and Trulicity. Trulicity dose was increased last visit. Patient reports she sometimes forgets taking her doses. Reports numbness/tingling in her bilateral feet along with shooting pain. She has tried gabapentin in the past-reported side effects so she stopped taking it. Last HbA1c was 11.6%>>10.6%>>10.2%>>10.9%  Sugars remain elevated. Noted to have microalbuminuria: On Lisinopril. Knee Pain: Patient saw Orthopedics. She was started on topical NSAID along with oral and referred to physical therapy. HTN: Controlled on atenolol and lisinopril. HLD: She was started back on atorvastatin. LDL in 5/22 was 110    GERD: Reports significant improvement in cough and heartburn since she was started on Protonix,    Healthcare maintenance:  Mammogram showed 3 likely 9 foci in the left breast.  Repeat ultrasound recommended in 6 months. Referred to GI for colon cancer screening  She had a pap smear with OBGYN in 8/22. Review of Systems   Constitutional:  Negative for chills, diaphoresis, fever, malaise/fatigue and weight loss. HENT:  Negative for congestion, ear discharge, ear pain, hearing loss, nosebleeds, sinus pain, sore throat and tinnitus. Eyes:  Negative for blurred vision, double vision and photophobia.    Respiratory:  Positive for shortness of breath. Negative for cough, sputum production, wheezing and stridor. Cardiovascular:  Positive for palpitations. Negative for chest pain, orthopnea, claudication and leg swelling. Gastrointestinal:  Positive for heartburn. Negative for abdominal pain, blood in stool, constipation, diarrhea, melena, nausea and vomiting. Genitourinary:  Positive for frequency. Negative for dysuria, flank pain, hematuria and urgency. Musculoskeletal:  Positive for back pain and joint pain. Negative for myalgias and neck pain. Skin:  Negative for rash. Neurological:  Positive for tingling and sensory change. Negative for tremors, speech change, focal weakness, seizures, weakness and headaches. Psychiatric/Behavioral:  Negative for depression. The patient is not nervous/anxious. All other systems reviewed and are negative. Allergies   Allergen Reactions    Codeine Itching       PHQ Screening   3 most recent PHQ Screens 11/18/2022   Little interest or pleasure in doing things Not at all   Feeling down, depressed, irritable, or hopeless Not at all   Total Score PHQ 2 0   Trouble falling or staying asleep, or sleeping too much -   Feeling tired or having little energy -   Poor appetite, weight loss, or overeating -   Feeling bad about yourself - or that you are a failure or have let yourself or your family down -   Trouble concentrating on things such as school, work, reading, or watching TV -   Moving or speaking so slowly that other people could have noticed; or the opposite being so fidgety that others notice -   Thoughts of being better off dead, or hurting yourself in some way -   PHQ 9 Score -   How difficult have these problems made it for you to do your work, take care of your home and get along with others -       History  Past Medical History:   Diagnosis Date    Arthritis     Diabetes ([facility] [address]        History reviewed. No pertinent surgical history.     Social History     Socioeconomic History Marital status:      Spouse name: Not on file    Number of children: Not on file    Years of education: Not on file    Highest education level: Not on file   Occupational History    Not on file   Tobacco Use    Smoking status: Never    Smokeless tobacco: Never   Vaping Use    Vaping Use: Never used   Substance and Sexual Activity    Alcohol use: Yes     Comment: Rare    Drug use: Yes     Types: Marijuana     Comment: PRN when she can't sleep, edibles    Sexual activity: Never   Other Topics Concern    Not on file   Social History Narrative    Not on file     Social Determinants of Health     Financial Resource Strain: Not on file   Food Insecurity: Not on file   Transportation Needs: Not on file   Physical Activity: Unknown    Days of Exercise per Week: 0 days    Minutes of Exercise per Session: Not on file   Stress: Not on file   Social Connections: Not on file   Intimate Partner Violence: Not At Risk    Fear of Current or Ex-Partner: No    Emotionally Abused: No    Physically Abused: No    Sexually Abused: No   Housing Stability: Not on file       Current Outpatient Medications   Medication Sig Dispense Refill    albuterol (PROVENTIL HFA, VENTOLIN HFA, PROAIR HFA) 90 mcg/actuation inhaler TAKE 1 PUFF BY MOUTH EVERY 4 HOURS AS NEEDED FOR WHEEZING OR SHORTNESS OF BREATH 8.5 Each 2    atenoloL (TENORMIN) 50 mg tablet Take 1 Tablet by mouth daily. 90 Tablet 1    atorvastatin (LIPITOR) 20 mg tablet Take 1 Tablet by mouth daily. 90 Tablet 1    diclofenac EC (VOLTAREN) 50 mg EC tablet Take 1 Tablet by mouth two (2) times daily (with meals). 60 Tablet 1    metFORMIN ER (GLUCOPHAGE XR) 500 mg tablet Take 1,000 mg by mouth two (2) times a day. 120 Tablet 3    lisinopriL (PRINIVIL, ZESTRIL) 5 mg tablet Take 1 Tablet by mouth daily. 90 Tablet 1    pantoprazole (PROTONIX) 40 mg tablet Take 1 Tablet by mouth daily.  90 Tablet 1    semaglutide (Ozempic) 1 mg/dose (4 mg/3 mL) pnij 1 mg by SubCUTAneous route every seven (7) days. STOP TRULICITY  Indications: type 2 diabetes mellitus 8 Each 0    Insulin Needles, Disposable, 31 gauge x 5/16\" ndle Use with insulin lantus nightly 50 Each 5    OneTouch Verio test strips strip       OneTouch Delica Plus Lancet 33 gauge misc            Vitals:    11/18/22 0816   BP: 107/62   Pulse: 79   Resp: 17   SpO2: 98%   Weight: 293 lb (132.9 kg)   Height: 5' 6\" (1.676 m)   PainSc:   4   PainLoc: Knee       Physical Exam  Vitals and nursing note reviewed. Constitutional:       General: She is not in acute distress. Appearance: Normal appearance. She is obese. She is not ill-appearing, toxic-appearing or diaphoretic. HENT:      Head: Normocephalic and atraumatic. Eyes:      General: No scleral icterus. Extraocular Movements: Extraocular movements intact. Conjunctiva/sclera: Conjunctivae normal.      Pupils: Pupils are equal, round, and reactive to light. Cardiovascular:      Rate and Rhythm: Normal rate and regular rhythm. Pulses: Normal pulses. Heart sounds: No murmur heard. Pulmonary:      Effort: Pulmonary effort is normal. No respiratory distress. Breath sounds: Normal breath sounds. No wheezing or rales. Abdominal:      General: Bowel sounds are normal. There is no distension. Palpations: Abdomen is soft. Tenderness: There is no abdominal tenderness. There is no guarding. Musculoskeletal:      Cervical back: Normal range of motion. Right lower leg: Edema present. Left lower leg: Edema present. Comments: Limited ROM due to back and knee pain  Trace LE edema   Skin:     General: Skin is warm and dry. Coloration: Skin is not jaundiced or pale. Neurological:      General: No focal deficit present. Mental Status: She is alert and oriented to person, place, and time. Mental status is at baseline. Cranial Nerves: No cranial nerve deficit. Motor: No weakness.       Gait: Gait normal.   Psychiatric:         Mood and Affect: Mood normal.         Behavior: Behavior normal.         Thought Content: Thought content normal.         Judgment: Judgment normal.     Diabetic foot exam: (5/3/22)    Left Foot:   Visual Exam: normal    Pulse DP: 2+ (normal)   Filament test: reduced sensation          Right Foot:   Visual Exam: normal    Pulse DP: 2+ (normal)   Filament test: reduced sensation            Office Visit on 11/18/2022   Component Date Value Ref Range Status    Hemoglobin A1c (POC) 11/18/2022 10.9  % Final       Results for orders placed or performed in visit on 11/18/22   AMB POC HEMOGLOBIN A1C   Result Value Ref Range    Hemoglobin A1c (POC) 10.9 %         Patient Care Team:  Patient Care Team:  Kulwinder Araujo MD as PCP - General (Internal Medicine Physician)  Kulwinder Araujo MD as PCP - [facility] Provider      Assessment / Plan:      ICD-10-CM ICD-9-CM    1. Type 2 diabetes mellitus with hyperglycemia, unspecified whether long term insulin use (HCC)  E11.65 250.00 AMB POC HEMOGLOBIN A1C      metFORMIN ER (GLUCOPHAGE XR) 500 mg tablet      MICROALBUMIN, UR, RAND W/ MICROALB/CREAT RATIO      semaglutide (Ozempic) 1 mg/dose (4 mg/3 mL) pnij      2. Shortness of breath  R06.02 786.05 albuterol (PROVENTIL HFA, VENTOLIN HFA, PROAIR HFA) 90 mcg/actuation inhaler      3. Essential hypertension  I10 401.9 atenoloL (TENORMIN) 50 mg tablet      4. Hyperlipidemia, unspecified hyperlipidemia type  E78.5 272.4 atorvastatin (LIPITOR) 20 mg tablet      5. Patellofemoral syndrome, bilateral  M22.2X1 719.46 diclofenac EC (VOLTAREN) 50 mg EC tablet    M22.2X2        6. Trigger finger, right middle finger  M65.331 727.03 diclofenac EC (VOLTAREN) 50 mg EC tablet      7. Microalbuminuria  R80.9 791.0 lisinopriL (PRINIVIL, ZESTRIL) 5 mg tablet      8. Gastroesophageal reflux disease without esophagitis  K21.9 530.81 pantoprazole (PROTONIX) 40 mg tablet            T2DM: No improvement in HbA1c despite increasing dose of Trulicity.   HbA1c today is 10.9%. Counseled take medication consistently. Continue Lantus 10 units nightly, continue metformin 1000 mg twice daily. Discontinue Trulicity and switch over to Ozempic. If no improvement by next visit-will refer to endocrinology. Foot exam with reduced sensation bilaterally. Does not want to take gabapentin or Lyrica. Microalbuminuria -continue lisinopril 5 mg daily. Counseled on diet and decreasing carbohydrates. HLD: Continue statin    HTN: BP at goal. Continue atenolol and lisinopril. Bilateral knee pain: Continue diclofenac. Obesity: Counseled on diet and exercise. GERD: Significantly improved with Protonix. Continue. Repeat ultrasound breast ordered    Referred to GI for colon cancer screening. Due for pap smear:  Referred to OBGYN. Has received COVID-19 vaccine X3 doses. Refused pneumonia vaccine. Follow-up in 6 weeks. Follow-up and Dispositions    Return in about 6 weeks (around 12/30/2022) for Diabetes F/U. I asked the patient if she  had any questions and answered her  questions. The patient stated that she understands the treatment plan and agrees with the treatment plan    This document was created with a voice activated dictation system and may contain transcription errors.

## 2022-11-29 ENCOUNTER — TELEPHONE (OUTPATIENT)
Dept: FAMILY MEDICINE CLINIC | Age: 52
End: 2022-11-29

## 2022-11-29 DIAGNOSIS — E11.65 TYPE 2 DIABETES MELLITUS WITH HYPERGLYCEMIA, UNSPECIFIED WHETHER LONG TERM INSULIN USE (HCC): Primary | ICD-10-CM

## 2022-11-29 RX ORDER — TIRZEPATIDE 2.5 MG/.5ML
2.5 INJECTION, SOLUTION SUBCUTANEOUS
Qty: 4 EACH | Refills: 1 | Status: SHIPPED | OUTPATIENT
Start: 2022-11-29

## 2022-11-29 NOTE — TELEPHONE ENCOUNTER
----- Message from Laron Gilbert sent at 11/26/2022  8:54 PM EST -----  Regarding: Insulin   I just found out that there is a [place] wide back order for [employer].   What about trying mounjaro

## 2022-11-29 NOTE — TELEPHONE ENCOUNTER
[facility] has been placed and submitted. Awaiting response.  (Key: [id number])

## 2022-11-30 ENCOUNTER — TELEPHONE (OUTPATIENT)
Dept: FAMILY MEDICINE CLINIC | Age: 52
End: 2022-11-30

## 2022-11-30 NOTE — TELEPHONE ENCOUNTER
Pharmacy  faxed in a request to have an alternative for the medication    Mounjaro 2.5 pen  please advise

## 2022-12-03 ENCOUNTER — HOSPITAL ENCOUNTER (EMERGENCY)
Age: 52
Discharge: HOME | End: 2022-12-03
Attending: EMERGENCY MEDICINE
Payer: COMMERCIAL

## 2022-12-03 VITALS
SYSTOLIC BLOOD PRESSURE: 168 MMHG | WEIGHT: 292 LBS | TEMPERATURE: 97.6 F | DIASTOLIC BLOOD PRESSURE: 87 MMHG | HEART RATE: 106 BPM | HEIGHT: 66 IN | OXYGEN SATURATION: 100 % | RESPIRATION RATE: 18 BRPM | BODY MASS INDEX: 46.93 KG/M2

## 2022-12-03 DIAGNOSIS — B30.8 CHRONIC VIRAL CONJUNCTIVITIS OF BOTH EYES: Primary | ICD-10-CM

## 2022-12-03 PROCEDURE — 74011250637 HC RX REV CODE- 250/637: Performed by: EMERGENCY MEDICINE

## 2022-12-03 PROCEDURE — 99283 EMERGENCY DEPT VISIT LOW MDM: CPT

## 2022-12-03 RX ORDER — ERYTHROMYCIN 5 MG/G
OINTMENT OPHTHALMIC
Qty: 3.5 G | Refills: 0 | Status: SHIPPED | OUTPATIENT
Start: 2022-12-03

## 2022-12-03 RX ORDER — ERYTHROMYCIN 5 MG/G
OINTMENT OPHTHALMIC
Status: COMPLETED | OUTPATIENT
Start: 2022-12-03 | End: 2022-12-03

## 2022-12-03 RX ORDER — POLYMYXIN B SULFATE AND TRIMETHOPRIM 1; 10000 MG/ML; [USP'U]/ML
1 SOLUTION OPHTHALMIC
COMMUNITY

## 2022-12-03 RX ADMIN — ERYTHROMYCIN: 5 OINTMENT OPHTHALMIC at 22:52

## 2022-12-03 NOTE — Clinical Note
[address] [facility] EMERGENCY DEPT  [id number] [id number] [phone]    Work/School Note    Date: 12/3/2022    To Whom It May concern:    Rob Melendrez was seen and treated today in the emergency room by the following provider(s):  Attending Provider: Joselito Moore MD.      Rob Melendrez is excused from work/school on 12/3/2022 through 12/5/2022. She is medically clear to return to work/school on 12/6/2022.          Sincerely,          Declan Rock RN

## 2022-12-03 NOTE — Clinical Note
[address] [facility] EMERGENCY DEPT  [id number] [address] [phone]    Work/School Note    Date: 12/3/2022    To Whom It May concern:    Norberto Thompson was seen and treated today in the emergency room by the following provider(s):  Attending Provider: Soco Braden MD.      Norberto Thompson is excused from work/school on 12/3/2022 through 12/5/2022. She is medically clear to return to work/school on 12/6/2022.          Sincerely,          Román Gao MD

## 2022-12-04 NOTE — ED NOTES
Pt A0X4, responds to verbal commands, noted pt squinting due to bilateral eye pain and light sensitivity. VS completed, bed low and locked, call bell within reach, will continue to monitor.

## 2022-12-04 NOTE — ED TRIAGE NOTES
A&O female with bilat eye pain, itching, discharge and redness x 10 days. Placed on eye drops at [facility] 7 days ago with no improvement.

## 2022-12-04 NOTE — ED NOTES
Pt unable to perform vision exam due to light sensitivity and bilateral eye squinting. MD made aware and ok for pt to be discharged.

## 2022-12-07 NOTE — ED PROVIDER NOTES
Eye Pain   Associated symptoms include pain. Pertinent negatives include no numbness and no dizziness. Pt is a 44 yo female who reports squinting due to bilateral eye pain and light sensitivity for 3 days. Her eye pain is mild. She denies getting something in her eyes. No fever, chills nasal congestion, vision changes. Past Medical History:   Diagnosis Date    Arthritis     Diabetes ([address]        History reviewed. No pertinent surgical history. Family History:   Problem Relation Age of Onset    Hypertension Mother     Cancer Father     Cancer Paternal Uncle     Cancer Paternal Grandmother     Diabetes Paternal Grandfather     Cancer Paternal Aunt        Social History     Socioeconomic History    Marital status:      Spouse name: Not on file    Number of children: Not on file    Years of education: Not on file    Highest education level: Not on file   Occupational History    Not on file   Tobacco Use    Smoking status: Never    Smokeless tobacco: Never   Vaping Use    Vaping Use: Never used   Substance and Sexual Activity    Alcohol use: Yes     Comment: Rare    Drug use: Yes     Types: Marijuana     Comment: PRN when she can't sleep, edibles    Sexual activity: Never   Other Topics Concern    Not on file   Social History Narrative    Not on file     Social Determinants of Health     Financial Resource Strain: Not on file   Food Insecurity: Not on file   Transportation Needs: Not on file   Physical Activity: Unknown    Days of Exercise per Week: 0 days   [employer] of Exercise per Session: Not on file   Stress: Not on file   Social Connections: Not on file   Intimate Partner Violence: Not At Risk    Fear of Current or Ex-Partner: No    Emotionally Abused: No    Physically Abused: No    Sexually Abused: No   Housing Stability: Not on file         ALLERGIES: Codeine    Review of Systems   Constitutional:  Negative for activity change and appetite change.    HENT: Negative for congestion, dental problem, ear pain, rhinorrhea, sinus pressure and sore throat. Eyes:  Positive for pain. Respiratory:  Negative for chest tightness, shortness of breath and wheezing. Cardiovascular:  Negative for chest pain. Endocrine: Negative for cold intolerance. Genitourinary:  Positive for flank pain. Negative for difficulty urinating. Neurological:  Negative for dizziness, light-headedness, numbness and headaches. Vitals:    12/03/22 2140 12/03/22 2151   BP: (!) 168/87    Pulse: (!) 106    Resp: 18    Temp: 97.6 °F (36.4 °C)    SpO2: 98% 100%   Weight: 132.5 kg (292 lb)    Height: 5' 6\" (1.676 m)             Physical Exam  Constitutional:       General: She is not in acute distress. Appearance: Normal appearance. She is ill-appearing. HENT:      Head: Normocephalic. Nose: Nose normal.   Eyes:      General:         Right eye: Discharge (minimal) present. Left eye: Discharge (minimal) present. Cardiovascular:      Rate and Rhythm: Normal rate and regular rhythm. Pulmonary:      Effort: Pulmonary effort is normal.      Breath sounds: Normal breath sounds. Abdominal:      General: Abdomen is flat. Palpations: Abdomen is soft. Musculoskeletal:      Cervical back: Neck supple. Skin:     Coloration: Skin is not jaundiced. Findings: No lesion. Neurological:      General: No focal deficit present. Mental Status: She is alert and oriented to person, place, and time.    Psychiatric:         Mood and Affect: Mood normal.        MDM  Risk of Complications, Morbidity, and/or Mortality  Presenting problems: moderate  Diagnostic procedures: moderate  Management options: moderate    Patient Progress  Patient progress: stable         Procedures

## 2022-12-08 ENCOUNTER — TELEPHONE (OUTPATIENT)
Dept: FAMILY MEDICINE CLINIC | Age: 52
End: 2022-12-08

## 2023-01-14 DIAGNOSIS — M65.331 TRIGGER FINGER, RIGHT MIDDLE FINGER: ICD-10-CM

## 2023-01-14 DIAGNOSIS — M22.2X1 PATELLOFEMORAL SYNDROME, BILATERAL: ICD-10-CM

## 2023-01-14 DIAGNOSIS — M22.2X2 PATELLOFEMORAL SYNDROME, BILATERAL: ICD-10-CM

## 2023-01-17 RX ORDER — DICLOFENAC SODIUM 50 MG/1
TABLET, DELAYED RELEASE ORAL
Qty: 60 TABLET | Refills: 1 | Status: SHIPPED | OUTPATIENT
Start: 2023-01-17

## 2023-02-21 ENCOUNTER — HOSPITAL ENCOUNTER (OUTPATIENT)
Facility: HOSPITAL | Age: 53
Setting detail: SPECIMEN
Discharge: HOME | End: 2023-02-24
Payer: COMMERCIAL

## 2023-02-21 ENCOUNTER — OFFICE VISIT (OUTPATIENT)
Facility: CLINIC | Age: 53
End: 2023-02-21
Payer: COMMERCIAL

## 2023-02-21 VITALS
BODY MASS INDEX: 46.45 KG/M2 | RESPIRATION RATE: 18 BRPM | DIASTOLIC BLOOD PRESSURE: 76 MMHG | HEART RATE: 88 BPM | WEIGHT: 289 LBS | OXYGEN SATURATION: 98 % | SYSTOLIC BLOOD PRESSURE: 123 MMHG | HEIGHT: 66 IN

## 2023-02-21 DIAGNOSIS — E11.65 TYPE 2 DIABETES MELLITUS WITH HYPERGLYCEMIA, WITH LONG-TERM CURRENT USE OF INSULIN (HCC): Primary | ICD-10-CM

## 2023-02-21 DIAGNOSIS — I10 ESSENTIAL (PRIMARY) HYPERTENSION: ICD-10-CM

## 2023-02-21 DIAGNOSIS — Z79.4 TYPE 2 DIABETES MELLITUS WITH HYPERGLYCEMIA, WITH LONG-TERM CURRENT USE OF INSULIN (HCC): Primary | ICD-10-CM

## 2023-02-21 DIAGNOSIS — B37.31 CANDIDA VAGINITIS: ICD-10-CM

## 2023-02-21 DIAGNOSIS — N30.01 ACUTE CYSTITIS WITH HEMATURIA: ICD-10-CM

## 2023-02-21 LAB
APPEARANCE UR: CLEAR
BACTERIA URNS QL MICRO: ABNORMAL /HPF
BILIRUB UR QL: NEGATIVE
BILIRUBIN, URINE, POC: NEGATIVE
BLOOD URINE, POC: ABNORMAL
COLOR UR: YELLOW
EPITH CASTS URNS QL MICRO: ABNORMAL /LPF (ref 0–5)
GLUCOSE UR STRIP.AUTO-MCNC: >1000 MG/DL
GLUCOSE URINE, POC: ABNORMAL
HBA1C MFR BLD: 13.6 %
HGB UR QL STRIP: NEGATIVE
KETONES UR QL STRIP.AUTO: NEGATIVE MG/DL
KETONES, URINE, POC: NEGATIVE
LEUKOCYTE ESTERASE UR QL STRIP.AUTO: NEGATIVE
LEUKOCYTE ESTERASE, URINE, POC: NEGATIVE
NITRITE UR QL STRIP.AUTO: NEGATIVE
NITRITE, URINE, POC: NEGATIVE
PH UR STRIP: 5.5 (ref 5–8)
PH, URINE, POC: 5.5 (ref 4.6–8)
PROT UR STRIP-MCNC: NEGATIVE MG/DL
PROTEIN,URINE, POC: NEGATIVE
RBC #/AREA URNS HPF: ABNORMAL /HPF (ref 0–5)
SP GR UR REFRACTOMETRY: >1.03 (ref 1–1.03)
SPECIFIC GRAVITY, URINE, POC: 1.01 (ref 1–1.03)
URINALYSIS CLARITY, POC: CLEAR
URINALYSIS COLOR, POC: YELLOW
UROBILINOGEN UR QL STRIP.AUTO: 0.2 EU/DL (ref 0.2–1)
UROBILINOGEN, POC: ABNORMAL
WBC URNS QL MICRO: ABNORMAL /HPF (ref 0–4)

## 2023-02-21 PROCEDURE — 81001 URINALYSIS AUTO W/SCOPE: CPT

## 2023-02-21 PROCEDURE — 3074F SYST BP LT 130 MM HG: CPT | Performed by: STUDENT IN AN ORGANIZED HEALTH CARE EDUCATION/TRAINING PROGRAM

## 2023-02-21 PROCEDURE — 83036 HEMOGLOBIN GLYCOSYLATED A1C: CPT | Performed by: STUDENT IN AN ORGANIZED HEALTH CARE EDUCATION/TRAINING PROGRAM

## 2023-02-21 PROCEDURE — 81001 URINALYSIS AUTO W/SCOPE: CPT | Performed by: STUDENT IN AN ORGANIZED HEALTH CARE EDUCATION/TRAINING PROGRAM

## 2023-02-21 PROCEDURE — 99214 OFFICE O/P EST MOD 30 MIN: CPT | Performed by: STUDENT IN AN ORGANIZED HEALTH CARE EDUCATION/TRAINING PROGRAM

## 2023-02-21 PROCEDURE — 3078F DIAST BP <80 MM HG: CPT | Performed by: STUDENT IN AN ORGANIZED HEALTH CARE EDUCATION/TRAINING PROGRAM

## 2023-02-21 RX ORDER — PEN NEEDLE, DIABETIC 31 GX5/16"
NEEDLE, DISPOSABLE MISCELLANEOUS
COMMUNITY
Start: 2023-02-16

## 2023-02-21 RX ORDER — NITROFURANTOIN 25; 75 MG/1; MG/1
100 CAPSULE ORAL 2 TIMES DAILY
Qty: 10 CAPSULE | Refills: 0 | Status: SHIPPED | OUTPATIENT
Start: 2023-02-21 | End: 2023-02-26

## 2023-02-21 RX ORDER — INSULIN GLARGINE 100 [IU]/ML
15 INJECTION, SOLUTION SUBCUTANEOUS NIGHTLY
Qty: 5 ADJUSTABLE DOSE PRE-FILLED PEN SYRINGE | Refills: 0
Start: 2023-02-21

## 2023-02-21 RX ORDER — SEMAGLUTIDE 1.34 MG/ML
1 INJECTION, SOLUTION SUBCUTANEOUS
COMMUNITY
Start: 2023-02-08

## 2023-02-21 RX ORDER — FLUCONAZOLE 150 MG/1
150 TABLET ORAL ONCE
Qty: 1 TABLET | Refills: 0 | Status: SHIPPED | OUTPATIENT
Start: 2023-02-21 | End: 2023-02-21

## 2023-02-21 ASSESSMENT — ENCOUNTER SYMPTOMS
RHINORRHEA: 0
DIARRHEA: 0
CHEST TIGHTNESS: 0
WHEEZING: 0
COUGH: 0
VOMITING: 0
BLOOD IN STOOL: 0
SHORTNESS OF BREATH: 0
NAUSEA: 0
BACK PAIN: 0
ABDOMINAL PAIN: 0

## 2023-02-21 NOTE — PROGRESS NOTES
Francisco Morel is a 45 y.o. female presenting today for Follow-up Chronic Condition (Was recently exposed to [place] 3 days prior. Believes she has a UTI, heaviness and pain in abdomen, for the last 2 days. Just started Ozempic on Sunday. Neck pain and is requesting a muscle relaxer)  . Chief Complaint   Patient presents with    Follow-up Chronic Condition     Was recently exposed to [place] 3 days prior. Believes she has a UTI, heaviness and pain in abdomen, for the last 2 days. Just started Ozempic on Sunday. Neck pain and is requesting a muscle relaxer       HPI:  Francisco Morel presents to the office today for follow up. Patient has a PMHx of T2DM with neuropathy, HTN, HLD.     T2DM: Patient is on Lantus 10 units nightly along with metformin and Ozempic. She was Patient reports she sometimes forgets taking her doses. Reports numbness/tingling in her bilateral feet along with shooting pain. She has tried gabapentin in the past-reported side effects so she stopped taking it. Last HbA1c was 11.6%>>10.6%>>10.2%>>10.9%>> 13.6%  Sugars remain elevated. Noted to have microalbuminuria: On Lisinopril. Patient admits to having an unhealthy diet. She has been snacking on carbs and sugars. Knee Pain: Patient saw Orthopedics. She was started on topical NSAID along with oral and referred to physical therapy. HTN: Controlled on atenolol and lisinopril. HLD: She was started back on atorvastatin. LDL in 5/22 was 110     GERD: Reports significant improvement in cough and heartburn since she was started on Protonix,     Healthcare maintenance:  Mammogram showed 3 likely 9 foci in the left breast.  Repeat ultrasound recommended in 6 months. Referred to GI for colon cancer screening  She had a pap smear with OBGYN in 8/22. Review of Systems   Constitutional:  Negative for activity change, appetite change, chills, diaphoresis, fatigue, fever and unexpected weight change.    HENT:  Negative for congestion, nosebleeds, postnasal drip and rhinorrhea. Respiratory:  Negative for cough, chest tightness, shortness of breath and wheezing. Cardiovascular:  Negative for chest pain and leg swelling. Gastrointestinal:  Negative for abdominal pain, blood in stool, diarrhea, nausea and vomiting. Endocrine: Negative for polydipsia and polyphagia. Genitourinary:  Positive for dysuria and frequency. Negative for decreased urine volume and pelvic pain. Musculoskeletal:  Negative for back pain, myalgias and neck pain. Neurological:  Negative for dizziness, tremors, seizures, syncope, speech difficulty, weakness, numbness and headaches. Psychiatric/Behavioral:  Negative for agitation and confusion. The patient is not nervous/anxious. All other systems reviewed and are negative. Allergies   Allergen Reactions    Codeine Itching       PHQ Screening   No flowsheet data found. History  Past Medical History:   Diagnosis Date    Arthritis     Diabetes ([facility] [address]        History reviewed. No pertinent surgical history.     Social History     Socioeconomic History    Marital status:      Spouse name: Not on file    Number of children: Not on file    Years of education: Not on file    Highest education level: Not on file   Occupational History    Not on file   Tobacco Use    Smoking status: Never    Smokeless tobacco: Never   Substance and Sexual Activity    Alcohol use: Yes    Drug use: Yes     Types: Marijuana [place])    Sexual activity: Not on file   Other Topics Concern    Not on file   Social History Narrative    Not on file     Social Determinants of Health     Financial Resource Strain: Not on file   Food Insecurity: Not on file   Transportation Needs: Not on file   Physical Activity: Unknown    Days of Exercise per Week: 0 days    Minutes of Exercise per Session: Not on file   Stress: Not on file   Social Connections: Not on file   Intimate Partner Violence: Not At Risk    Fear of Current or Ex-Partner: No    Emotionally Abused: No    Physically Abused: No    Sexually Abused: No   Housing Stability: Not on file       Current Outpatient Medications   Medication Sig Dispense Refill    OZEMPIC, 1 MG/DOSE, 4 MG/3ML SOPN Inject 1 mg into the skin every 7 days      nitrofurantoin, macrocrystal-monohydrate, (MACROBID) 100 MG capsule Take 1 capsule by mouth 2 times daily for 5 days 10 capsule 0    fluconazole (DIFLUCAN) 150 MG tablet Take 1 tablet by mouth once for 1 dose 1 tablet 0    insulin glargine (BASAGLAR KWIKPEN) 100 UNIT/ML injection pen Inject 15 Units into the skin nightly 5 Adjustable Dose Pre-filled Pen Syringe 0    albuterol sulfate HFA (PROVENTIL;VENTOLIN;PROAIR) 108 (90 Base) MCG/ACT inhaler TAKE 1 PUFF BY MOUTH EVERY 4 HOURS AS NEEDED FOR WHEEZING OR SHORTNESS OF BREATH      atenolol (TENORMIN) 50 MG tablet Take 50 mg by mouth daily      atorvastatin (LIPITOR) 20 MG tablet Take 20 mg by mouth daily      diclofenac (VOLTAREN) 50 MG EC tablet Take 50 mg by mouth 2 times daily (with meals)      erythromycin (ROMYCIN) 5 MG/GM ophthalmic ointment Apply to affected eye(s) six (6) times a day for 7 days. lisinopril (PRINIVIL;ZESTRIL) 5 MG tablet Take 5 mg by mouth daily      metFORMIN (GLUCOPHAGE-XR) 500 MG extended release tablet Take 1,000 mg by mouth two (2) times a day. pantoprazole (PROTONIX) 40 MG tablet Take 40 mg by mouth daily      trimethoprim-polymyxin b (POLYTRIM) 64076-5.1 UNIT/ML-% ophthalmic solution Apply 1 drop to eye      B-D ULTRAFINE III SHORT PEN 31G X 8 MM MISC USE WITH INSULIN LANTUS NIGHTLY       No current facility-administered medications for this visit. /76 (Site: Right Upper Arm, Position: Sitting, Cuff Size: Thigh) Comment: takes meds daily  Pulse 88   Resp 18   Ht 5' 6\" (1.676 m)   Wt 289 lb (131.1 kg)   SpO2 98%   BMI 46.65 kg/m²      Physical Exam  Vitals and nursing note reviewed.    Constitutional:       General: She is not in acute distress. Appearance: Normal appearance. She is obese. She is not ill-appearing, toxic-appearing or diaphoretic. HENT:      Head: Normocephalic and atraumatic. Eyes:      General: No scleral icterus. Extraocular Movements: Extraocular movements intact. Conjunctiva/sclera: Conjunctivae normal.      Pupils: Pupils are equal, round, and reactive to light. Cardiovascular:      Rate and Rhythm: Normal rate and regular rhythm. Pulses: Normal pulses. Heart sounds: Normal heart sounds. No murmur heard. Pulmonary:      Effort: Pulmonary effort is normal. No respiratory distress. Breath sounds: Normal breath sounds. No wheezing or rales. Abdominal:      Tenderness: There is abdominal tenderness. Musculoskeletal:         General: Normal range of motion. Cervical back: Normal range of motion and neck supple. Right lower leg: No edema. Left lower leg: No edema. Skin:     General: Skin is warm and dry. Neurological:      General: No focal deficit present. Mental Status: She is alert and oriented to person, place, and time. Mental status is at baseline. Cranial Nerves: No cranial nerve deficit. Motor: No weakness. Gait: Gait normal.   Psychiatric:         Mood and Affect: Mood normal.         Behavior: Behavior normal.         Thought Content:  Thought content normal.         Judgment: Judgment normal.        Office Visit on 02/21/2023   Component Date Value Ref Range Status    Hemoglobin A1C, POC 02/21/2023 13.6  % Final    Color (UA POC) 02/21/2023 Yellow   Final    Clarity (UA POC) 02/21/2023 Clear   Final    Glucose, Urine, POC 02/21/2023 3+  Negative Final    Bilirubin, Urine, POC 02/21/2023 Negative  Negative Final    Ketones, Urine, POC 02/21/2023 Negative  Negative Final    Specific Gravity, Urine, POC 02/21/2023 1.015  1.001 - 1.035 Final    Blood (UA POC) 02/21/2023 Trace  Negative Final    pH, Urine, POC 02/21/2023 5.5  4.6 - 8.0 Final Protein, Urine, POC 02/21/2023 Negative  Negative Final    Urobilinogen, POC 02/21/2023 0.2 mg/dL   Final    Nitrite, Urine, POC 02/21/2023 Negative  Negative Final    Leukocyte Esterase, Urine, POC 02/21/2023 Negative  Negative Final       Results for orders placed or performed in visit on 02/21/23   AMB POC HEMOGLOBIN A1C   Result Value Ref Range    Hemoglobin A1C, POC 13.6 %   AMB POC URINALYSIS DIP STICK AUTO W/ MICRO   Result Value Ref Range    Color (UA POC) Yellow     Clarity (UA POC) Clear     Glucose, Urine, POC 3+ Negative    Bilirubin, Urine, POC Negative Negative    Ketones, Urine, POC Negative Negative    Specific Gravity, Urine, POC 1.015 1.001 - 1.035    Blood (UA POC) Trace Negative    pH, Urine, POC 5.5 4.6 - 8.0    Protein, Urine, POC Negative Negative    Urobilinogen, POC 0.2 mg/dL     Nitrite, Urine, POC Negative Negative    Leukocyte Esterase, Urine, POC Negative Negative       Patient Care Team:  Patient Care Team:  Mahamed Whitaker MD as PCP - Mary Landaverde MD as PCP - [facility] Provider      Assessment / Plan:     Diagnosis Orders   1. Type 2 diabetes mellitus with hyperglycemia, with long-term current use of insulin (HCC)  AMB POC HEMOGLOBIN A1C    insulin glargine (BASAGLAR KWIKPEN) 100 UNIT/ML injection pen      2. Essential (primary) hypertension        3. Acute cystitis with hematuria  AMB POC URINALYSIS DIP STICK AUTO W/ MICRO    nitrofurantoin, macrocrystal-monohydrate, (MACROBID) 100 MG capsule    Urinalysis with Microscopic      4. Candida vaginitis  fluconazole (DIFLUCAN) 150 MG tablet          Urinary symptoms: POC UA showed trace blood with 3+ glucose. Patient reports dysuria, pelvic discomfort increased urinary frequency. Will empirically treat with Macrobid. We will also prescribe empiric fluconazole as she has recurrent vaginitis with antibiotics. T2DM: HbA1c increased to 13.6% today. Increase Lantus 15 units nightly. Continue metformin 1000 mg twice daily. Patient was switched over to Ozempic-started taking 1 mg weekly this week. If no improvement by next visit-will refer to endocrinology. Foot exam with reduced sensation bilaterally. Does not want to take gabapentin or Lyrica. Microalbuminuria -continue lisinopril 5 mg daily. Counseled on diet and decreasing carbohydrates. HLD: Continue statin     HTN: BP at goal. Continue atenolol and lisinopril. Bilateral knee pain: Continue diclofenac. Obesity: Counseled on diet and exercise. GERD: Significantly improved with Protonix. Continue. Referred to GI for colon cancer screening. Due for pap smear:  Referred to OBGYN. Return in about 6 weeks (around 4/4/2023). I asked the patient if she  had any questions and answered her  questions. The patient stated that she understands the treatment plan and agrees with the treatment plan    This document was created with a voice activated dictation system and may contain transcription errors.

## 2023-05-16 DIAGNOSIS — E11.65 TYPE 2 DIABETES MELLITUS WITH HYPERGLYCEMIA (HCC): ICD-10-CM

## 2023-05-16 RX ORDER — METFORMIN HYDROCHLORIDE 500 MG/1
TABLET, EXTENDED RELEASE ORAL
Qty: 180 TABLET | Refills: 0 | Status: SHIPPED | OUTPATIENT
Start: 2023-05-16

## 2023-05-30 DIAGNOSIS — I10 ESSENTIAL (PRIMARY) HYPERTENSION: ICD-10-CM

## 2023-05-30 DIAGNOSIS — E78.5 HYPERLIPIDEMIA, UNSPECIFIED: ICD-10-CM

## 2023-05-30 DIAGNOSIS — R80.9 PROTEINURIA, UNSPECIFIED: ICD-10-CM

## 2023-06-01 RX ORDER — ATENOLOL 50 MG/1
TABLET ORAL
Qty: 30 TABLET | Refills: 0 | Status: SHIPPED | OUTPATIENT
Start: 2023-06-01

## 2023-06-01 RX ORDER — ATORVASTATIN CALCIUM 20 MG/1
TABLET, FILM COATED ORAL
Qty: 30 TABLET | Refills: 0 | Status: SHIPPED | OUTPATIENT
Start: 2023-06-01

## 2023-06-01 RX ORDER — LISINOPRIL 5 MG/1
TABLET ORAL
Qty: 30 TABLET | Refills: 0 | Status: SHIPPED | OUTPATIENT
Start: 2023-06-01 | End: 2023-07-18 | Stop reason: SDUPTHER

## 2023-06-06 DIAGNOSIS — R06.02 SHORTNESS OF BREATH: ICD-10-CM

## 2023-06-06 RX ORDER — ALBUTEROL SULFATE 90 UG/1
AEROSOL, METERED RESPIRATORY (INHALATION)
Qty: 8.5 EACH | Refills: 2 | OUTPATIENT
Start: 2023-06-06

## 2023-06-27 DIAGNOSIS — R06.02 SHORTNESS OF BREATH: Primary | ICD-10-CM

## 2023-06-28 RX ORDER — ALBUTEROL SULFATE 90 UG/1
AEROSOL, METERED RESPIRATORY (INHALATION)
Qty: 8.5 EACH | Refills: 0 | Status: SHIPPED | OUTPATIENT
Start: 2023-06-28

## 2023-07-06 ENCOUNTER — OFFICE VISIT (OUTPATIENT)
Facility: CLINIC | Age: 53
End: 2023-07-06
Payer: COMMERCIAL

## 2023-07-06 VITALS
DIASTOLIC BLOOD PRESSURE: 76 MMHG | HEIGHT: 66 IN | OXYGEN SATURATION: 97 % | WEIGHT: 289 LBS | SYSTOLIC BLOOD PRESSURE: 125 MMHG | BODY MASS INDEX: 46.45 KG/M2 | HEART RATE: 103 BPM | RESPIRATION RATE: 16 BRPM

## 2023-07-06 DIAGNOSIS — F32.A ANXIETY AND DEPRESSION: ICD-10-CM

## 2023-07-06 DIAGNOSIS — Z79.4 TYPE 2 DIABETES MELLITUS WITH HYPERGLYCEMIA, WITH LONG-TERM CURRENT USE OF INSULIN (HCC): Primary | ICD-10-CM

## 2023-07-06 DIAGNOSIS — I10 ESSENTIAL (PRIMARY) HYPERTENSION: ICD-10-CM

## 2023-07-06 DIAGNOSIS — Z12.11 SCREENING FOR COLON CANCER: ICD-10-CM

## 2023-07-06 DIAGNOSIS — K21.9 GASTRO-ESOPHAGEAL REFLUX DISEASE WITHOUT ESOPHAGITIS: ICD-10-CM

## 2023-07-06 DIAGNOSIS — E78.5 HYPERLIPIDEMIA, UNSPECIFIED HYPERLIPIDEMIA TYPE: ICD-10-CM

## 2023-07-06 DIAGNOSIS — R92.8 ABNORMAL MAMMOGRAM OF LEFT BREAST: ICD-10-CM

## 2023-07-06 DIAGNOSIS — F41.9 ANXIETY AND DEPRESSION: ICD-10-CM

## 2023-07-06 DIAGNOSIS — E11.65 TYPE 2 DIABETES MELLITUS WITH HYPERGLYCEMIA, WITH LONG-TERM CURRENT USE OF INSULIN (HCC): Primary | ICD-10-CM

## 2023-07-06 LAB — HBA1C MFR BLD: 12.7 %

## 2023-07-06 PROCEDURE — 3074F SYST BP LT 130 MM HG: CPT | Performed by: STUDENT IN AN ORGANIZED HEALTH CARE EDUCATION/TRAINING PROGRAM

## 2023-07-06 PROCEDURE — 3078F DIAST BP <80 MM HG: CPT | Performed by: STUDENT IN AN ORGANIZED HEALTH CARE EDUCATION/TRAINING PROGRAM

## 2023-07-06 PROCEDURE — 99214 OFFICE O/P EST MOD 30 MIN: CPT | Performed by: STUDENT IN AN ORGANIZED HEALTH CARE EDUCATION/TRAINING PROGRAM

## 2023-07-06 PROCEDURE — 83036 HEMOGLOBIN GLYCOSYLATED A1C: CPT | Performed by: STUDENT IN AN ORGANIZED HEALTH CARE EDUCATION/TRAINING PROGRAM

## 2023-07-06 RX ORDER — INSULIN GLARGINE 100 [IU]/ML
20 INJECTION, SOLUTION SUBCUTANEOUS NIGHTLY
Qty: 5 ADJUSTABLE DOSE PRE-FILLED PEN SYRINGE | Refills: 1 | Status: SHIPPED | OUTPATIENT
Start: 2023-07-06

## 2023-07-06 RX ORDER — SEMAGLUTIDE 1.34 MG/ML
1 INJECTION, SOLUTION SUBCUTANEOUS
Qty: 12 ML | Refills: 1 | Status: SHIPPED | OUTPATIENT
Start: 2023-07-06

## 2023-07-06 SDOH — ECONOMIC STABILITY: INCOME INSECURITY: HOW HARD IS IT FOR YOU TO PAY FOR THE VERY BASICS LIKE FOOD, HOUSING, MEDICAL CARE, AND HEATING?: PATIENT DECLINED

## 2023-07-06 SDOH — ECONOMIC STABILITY: FOOD INSECURITY: WITHIN THE PAST 12 MONTHS, YOU WORRIED THAT YOUR FOOD WOULD RUN OUT BEFORE YOU GOT MONEY TO BUY MORE.: PATIENT DECLINED

## 2023-07-06 SDOH — ECONOMIC STABILITY: FOOD INSECURITY: WITHIN THE PAST 12 MONTHS, THE FOOD YOU BOUGHT JUST DIDN'T LAST AND YOU DIDN'T HAVE MONEY TO GET MORE.: PATIENT DECLINED

## 2023-07-06 SDOH — ECONOMIC STABILITY: HOUSING INSECURITY
IN THE LAST 12 MONTHS, WAS THERE A TIME WHEN YOU DID NOT HAVE A STEADY PLACE TO SLEEP OR SLEPT IN A SHELTER (INCLUDING NOW)?: PATIENT REFUSED

## 2023-07-06 ASSESSMENT — ANXIETY QUESTIONNAIRES
3. WORRYING TOO MUCH ABOUT DIFFERENT THINGS: 0
2. NOT BEING ABLE TO STOP OR CONTROL WORRYING: 0
1. FEELING NERVOUS, ANXIOUS, OR ON EDGE: 0
5. BEING SO RESTLESS THAT IT IS HARD TO SIT STILL: 0
6. BECOMING EASILY ANNOYED OR IRRITABLE: 0
GAD7 TOTAL SCORE: 0
IF YOU CHECKED OFF ANY PROBLEMS ON THIS QUESTIONNAIRE, HOW DIFFICULT HAVE THESE PROBLEMS MADE IT FOR YOU TO DO YOUR WORK, TAKE CARE OF THINGS AT HOME, OR GET ALONG WITH OTHER PEOPLE: NOT DIFFICULT AT ALL
7. FEELING AFRAID AS IF SOMETHING AWFUL MIGHT HAPPEN: 0
4. TROUBLE RELAXING: 0

## 2023-07-06 ASSESSMENT — PATIENT HEALTH QUESTIONNAIRE - PHQ9
SUM OF ALL RESPONSES TO PHQ9 QUESTIONS 1 & 2: 0
SUM OF ALL RESPONSES TO PHQ QUESTIONS 1-9: 0
1. LITTLE INTEREST OR PLEASURE IN DOING THINGS: 0
2. FEELING DOWN, DEPRESSED OR HOPELESS: 0
SUM OF ALL RESPONSES TO PHQ QUESTIONS 1-9: 0

## 2023-07-06 ASSESSMENT — ENCOUNTER SYMPTOMS
COUGH: 0
CHEST TIGHTNESS: 0
BLOOD IN STOOL: 0
SHORTNESS OF BREATH: 0
VOMITING: 0
WHEEZING: 0
BACK PAIN: 0
NAUSEA: 0
ABDOMINAL PAIN: 0
DIARRHEA: 0
RHINORRHEA: 0

## 2023-07-06 NOTE — PROGRESS NOTES
Blessing Nicholson is a 52 y.o. female presenting today for Follow-up Chronic Condition  . Chief Complaint   Patient presents with    Follow-up Chronic Condition       HPI:  Blessing Nicholson presents to the office today for follow up. Patient has a PMHx of T2DM with neuropathy, HTN, HLD.     T2DM: Patient is on Lantus 10 units nightly along with metformin and Ozempic. She was Patient reports she sometimes forgets taking her doses. Reports numbness/tingling in her bilateral feet along with shooting pain. She has tried gabapentin in the past-reported side effects so she stopped taking it. Last HbA1c was 11.6%>>10.6%>>10.2%>>10.9%>> 13.6>>12.7%  Sugars remain elevated. Noted to have microalbuminuria: On Lisinopril. Patient admits to having an unhealthy diet. She has been snacking on carbs and sugars. Patient did not follow-up with time-she ran out of her Lantus and Ozempic since the past month. Reports that when she was on the Ozempic, her sugars were doing better. Knee Pain: Patient saw Orthopedics. She was started on topical NSAID along with oral and referred to physical therapy. HTN: Controlled on atenolol and lisinopril. HLD: She was started back on atorvastatin. LDL in 5/22 was 110     GERD: Reports significant improvement in cough and heartburn since she was started on Protonix. Patient reports increased stress recently. Her  had multiple health issues and required increased care. In between she also felt very depressed and hopeless but reports that she is doing much better now and would like to get her life back on track in terms of her health care. She denies any feelings of self-harm or suicidal ideation. She is not interested in taking medications at this time. Healthcare maintenance:  Mammogram showed 3 likely 9 foci in the left breast.  Repeat ultrasound recommended in 6 months but patient did not follow-up.   Referred to GI for colon cancer screening -does not

## 2023-07-14 DIAGNOSIS — R80.9 PROTEINURIA, UNSPECIFIED: ICD-10-CM

## 2023-07-18 RX ORDER — LISINOPRIL 5 MG/1
5 TABLET ORAL DAILY
Qty: 30 TABLET | Refills: 1 | Status: SHIPPED | OUTPATIENT
Start: 2023-07-18

## 2023-07-19 ENCOUNTER — TELEPHONE (OUTPATIENT)
Facility: CLINIC | Age: 53
End: 2023-07-19

## 2023-07-19 DIAGNOSIS — R92.8 ABNORMAL MAMMOGRAM OF LEFT BREAST: Primary | ICD-10-CM

## 2023-07-25 ENCOUNTER — TELEPHONE (OUTPATIENT)
Facility: CLINIC | Age: 53
End: 2023-07-25

## 2023-07-25 DIAGNOSIS — Z79.4 TYPE 2 DIABETES MELLITUS WITH HYPERGLYCEMIA, WITH LONG-TERM CURRENT USE OF INSULIN (HCC): ICD-10-CM

## 2023-07-25 DIAGNOSIS — E11.65 TYPE 2 DIABETES MELLITUS WITH HYPERGLYCEMIA, WITH LONG-TERM CURRENT USE OF INSULIN (HCC): ICD-10-CM

## 2023-07-25 RX ORDER — INSULIN GLARGINE 100 [IU]/ML
20 INJECTION, SOLUTION SUBCUTANEOUS NIGHTLY
Qty: 6 ADJUSTABLE DOSE PRE-FILLED PEN SYRINGE | Refills: 1 | Status: SHIPPED | OUTPATIENT
Start: 2023-07-25

## 2023-07-25 NOTE — TELEPHONE ENCOUNTER
----- Message from Denice Peterson sent at 7/21/2023  4:03 PM EDT -----  Regarding: CHRIS Narayan  Contact: [phone]    ----- Message -----  From: Benjy Carbone  Sent: 7/21/2023   3:33 PM EDT  To: [facility] Staff  Subject: Benja Morris                                         Can you please put in for a 3 month supply of basaglar as it cost me more for 1 month.  Thank you in advance

## 2023-08-22 ENCOUNTER — HOSPITAL ENCOUNTER (OUTPATIENT)
Facility: HOSPITAL | Age: 53
Setting detail: SPECIMEN
Discharge: HOME | End: 2023-08-25
Payer: COMMERCIAL

## 2023-08-22 DIAGNOSIS — E78.5 HYPERLIPIDEMIA, UNSPECIFIED HYPERLIPIDEMIA TYPE: ICD-10-CM

## 2023-08-22 DIAGNOSIS — I10 ESSENTIAL (PRIMARY) HYPERTENSION: ICD-10-CM

## 2023-08-22 LAB
ALBUMIN SERPL-MCNC: 2.9 G/DL (ref 3.4–5)
ALBUMIN/GLOB SERPL: 0.8 (ref 0.8–1.7)
ALP SERPL-CCNC: 134 U/L (ref 45–117)
ALT SERPL-CCNC: 15 U/L (ref 13–56)
ANION GAP SERPL CALC-SCNC: 7 MMOL/L (ref 3–18)
AST SERPL-CCNC: 9 U/L (ref 10–38)
BASOPHILS # BLD: 0.1 K/UL (ref 0–0.1)
BASOPHILS NFR BLD: 1 % (ref 0–2)
BILIRUB SERPL-MCNC: 0.2 MG/DL (ref 0.2–1)
BUN SERPL-MCNC: 8 MG/DL (ref 7–18)
BUN/CREAT SERPL: 13 (ref 12–20)
CALCIUM SERPL-MCNC: 9 MG/DL (ref 8.5–10.1)
CHLORIDE SERPL-SCNC: 102 MMOL/L (ref 100–111)
CHOLEST SERPL-MCNC: 173 MG/DL
CO2 SERPL-SCNC: 28 MMOL/L (ref 21–32)
CREAT SERPL-MCNC: 0.62 MG/DL (ref 0.6–1.3)
DIFFERENTIAL METHOD BLD: ABNORMAL
EOSINOPHIL # BLD: 0.2 K/UL (ref 0–0.4)
EOSINOPHIL NFR BLD: 2 % (ref 0–5)
ERYTHROCYTE [DISTWIDTH] IN BLOOD BY AUTOMATED COUNT: 12.8 % (ref 11.6–14.5)
GLOBULIN SER CALC-MCNC: 3.8 G/DL (ref 2–4)
GLUCOSE SERPL-MCNC: 274 MG/DL (ref 74–99)
HCT VFR BLD AUTO: 35.8 % (ref 35–45)
HDLC SERPL-MCNC: 45 MG/DL (ref 40–60)
HDLC SERPL: 3.8 (ref 0–5)
HGB BLD-MCNC: 11.3 G/DL (ref 12–16)
IMM GRANULOCYTES # BLD AUTO: 0 K/UL (ref 0–0.04)
IMM GRANULOCYTES NFR BLD AUTO: 1 % (ref 0–0.5)
LDLC SERPL CALC-MCNC: 107.4 MG/DL (ref 0–100)
LIPID PANEL: ABNORMAL
LYMPHOCYTES # BLD: 3.3 K/UL (ref 0.9–3.6)
LYMPHOCYTES NFR BLD: 41 % (ref 21–52)
MCH RBC QN AUTO: 27.2 PG (ref 24–34)
MCHC RBC AUTO-ENTMCNC: 31.6 G/DL (ref 31–37)
MCV RBC AUTO: 86.3 FL (ref 78–100)
MONOCYTES # BLD: 0.5 K/UL (ref 0.05–1.2)
MONOCYTES NFR BLD: 6 % (ref 3–10)
NEUTS SEG # BLD: 3.9 K/UL (ref 1.8–8)
NEUTS SEG NFR BLD: 50 % (ref 40–73)
NRBC # BLD: 0 K/UL (ref 0–0.01)
NRBC BLD-RTO: 0 PER 100 WBC
PLATELET # BLD AUTO: 366 K/UL (ref 135–420)
PMV BLD AUTO: 10.4 FL (ref 9.2–11.8)
POTASSIUM SERPL-SCNC: 4.1 MMOL/L (ref 3.5–5.5)
PROT SERPL-MCNC: 6.7 G/DL (ref 6.4–8.2)
RBC # BLD AUTO: 4.15 M/UL (ref 4.2–5.3)
SODIUM SERPL-SCNC: 137 MMOL/L (ref 136–145)
TRIGL SERPL-MCNC: 103 MG/DL
VLDLC SERPL CALC-MCNC: 20.6 MG/DL
WBC # BLD AUTO: 7.9 K/UL (ref 4.6–13.2)

## 2023-08-22 PROCEDURE — 36415 COLL VENOUS BLD VENIPUNCTURE: CPT

## 2023-08-22 PROCEDURE — 85025 COMPLETE CBC W/AUTO DIFF WBC: CPT

## 2023-08-22 PROCEDURE — 80061 LIPID PANEL: CPT

## 2023-08-22 PROCEDURE — 80053 COMPREHEN METABOLIC PANEL: CPT

## 2024-11-15 NOTE — PROGRESS NOTES
"Patient stated she had regular eye doctor that was taking care of her. States she previously was on drops and got laser treatment (Possibly [facility]T(?)). States she is no longer on eye drops. Patient denies eye pain, changes in vision, blurry vision.     Ophtho consulted. During interview, patient became visibly upset and yelling about being "locked in intermediate". Interview terminated. Unable to assess intraocular pressure. Low suspicion for any acute event. Per chart review, cannot find any previous home eye meds. No complaints of vision     Plan  - Will re-consult ophthalmology if patient becomes more cooperative or acute concerns arise.   " Chief Complaint   Patient presents with    Follow-up    Knee Injury     1. \"Have you been to the ER, urgent care clinic since your last visit? Hospitalized since your last visit? \" No    2. \"Have you seen or consulted any other health care providers outside of the [address] [place] [address] since your last visit? \" No     3. For patients aged 39-70: Has the patient had a colonoscopy / FIT/ Cologuard? No      If the patient is female:    4. For patients aged 41-77: Has the patient had a mammogram within the past 2 years? Yes - Care Gap present. Most recent result on file      5. For patients aged 21-65: Has the patient had a pap smear?  No